# Patient Record
Sex: FEMALE | Race: WHITE | NOT HISPANIC OR LATINO
[De-identification: names, ages, dates, MRNs, and addresses within clinical notes are randomized per-mention and may not be internally consistent; named-entity substitution may affect disease eponyms.]

---

## 2018-02-21 PROBLEM — Z00.00 ENCOUNTER FOR PREVENTIVE HEALTH EXAMINATION: Status: ACTIVE | Noted: 2018-02-21

## 2018-02-22 ENCOUNTER — APPOINTMENT (OUTPATIENT)
Dept: VASCULAR SURGERY | Facility: CLINIC | Age: 43
End: 2018-02-22
Payer: MEDICAID

## 2018-02-22 DIAGNOSIS — M79.605 PAIN IN RIGHT LEG: ICD-10-CM

## 2018-02-22 DIAGNOSIS — M79.604 PAIN IN RIGHT LEG: ICD-10-CM

## 2018-02-22 PROCEDURE — 99243 OFF/OP CNSLTJ NEW/EST LOW 30: CPT

## 2018-02-23 VITALS — OXYGEN SATURATION: 95 % | SYSTOLIC BLOOD PRESSURE: 110 MMHG | DIASTOLIC BLOOD PRESSURE: 80 MMHG | HEART RATE: 84 BPM

## 2018-02-26 PROBLEM — M79.604 PAIN IN BOTH LOWER EXTREMITIES: Status: ACTIVE | Noted: 2018-02-23

## 2019-07-03 ENCOUNTER — APPOINTMENT (OUTPATIENT)
Dept: VASCULAR SURGERY | Facility: CLINIC | Age: 44
End: 2019-07-03
Payer: COMMERCIAL

## 2019-07-03 VITALS
DIASTOLIC BLOOD PRESSURE: 75 MMHG | HEART RATE: 88 BPM | TEMPERATURE: 98.3 F | BODY MASS INDEX: 55.32 KG/M2 | WEIGHT: 293 LBS | SYSTOLIC BLOOD PRESSURE: 110 MMHG | HEIGHT: 61 IN

## 2019-07-03 PROCEDURE — 99213 OFFICE O/P EST LOW 20 MIN: CPT

## 2019-07-03 PROCEDURE — 93970 EXTREMITY STUDY: CPT

## 2019-07-03 NOTE — ASSESSMENT
[FreeTextEntry1] : 42 yo female with history of pre dm, obesity presents for evaluation of b/l lower extremity edema with new ulcer over the posterior aspect of the left calf x 2 weeks\par pt states that she is unable to apply compression stockings her self \par venous duplex was negative for dvt difficult to assess for insufficency 2/2 body habitus \par will apply unna boot to left lower extremity to facilitate wound healing \par encouraged weight loss \par pt to follow up in 1 week

## 2019-07-03 NOTE — CONSULT LETTER
[Consult Letter:] : I had the pleasure of evaluating your patient, [unfilled]. [Dear  ___] : Dear  [unfilled], [FreeTextEntry3] : Linette Sharma PA-C\par [facility]\par  [Sincerely,] : Sincerely, [Please see my note below.] : Please see my note below.

## 2019-07-03 NOTE — HISTORY OF PRESENT ILLNESS
[FreeTextEntry1] : 42 yo female with history of pre dm, obesity presents for evaluation of b/l lower extremity edema with new ulcer over the posterior aspect of the left calf.  pt states that the ulcer developed about 2 weeks ago and has remained the same size.  pt states that she has a heavy feeling in both legs.  pt has been evaluated by bariatric surgery for gastric sleeve but was postponed for ovarian cyst removal.  \par pt denies any history of dvt in the past \par pt denies any purulent drainage, fevers or chills

## 2019-07-03 NOTE — PHYSICAL EXAM
[2+] : left 2+ [Ankle Swelling (On Exam)] : present [Ankle Swelling Bilaterally] : bilaterally  [] : bilaterally [Ankle Swelling On The Left] : moderate [No Rash or Lesion] : No rash or lesion [Alert] : alert [Skin Ulcer] : ulcer [Calm] : calm [de-identified] : appears well  [Varicose Veins Of Lower Extremities] : not present

## 2019-07-10 ENCOUNTER — APPOINTMENT (OUTPATIENT)
Dept: VASCULAR SURGERY | Facility: CLINIC | Age: 44
End: 2019-07-10
Payer: COMMERCIAL

## 2019-07-10 VITALS
BODY MASS INDEX: 55.32 KG/M2 | TEMPERATURE: 98.4 F | WEIGHT: 293 LBS | HEIGHT: 61 IN | DIASTOLIC BLOOD PRESSURE: 78 MMHG | HEART RATE: 86 BPM | SYSTOLIC BLOOD PRESSURE: 114 MMHG

## 2019-07-10 PROCEDURE — 99212 OFFICE O/P EST SF 10 MIN: CPT

## 2019-07-10 NOTE — HISTORY OF PRESENT ILLNESS
[FreeTextEntry1] : 42 yo female with history of pre dm, obesity, b/l lower extremity edema with ulcer over the posterior aspect of the left calf presents for follow up and unna boot change\par pt without any complaints at this time.  pt states that the leg feels better

## 2019-07-10 NOTE — ASSESSMENT
[FreeTextEntry1] : 44 yo female with history of pre dm, obesity, b/l lower extremity edema with ulcer over the posterior aspect of the left calf presents for follow up and unna boot change\par wound healed nicely with unna boot no further need for unna boot \par encouraged compression stockings and/or ace compressive wraps to include the feet \par encouraged weight loss\par pt to follow up as needed

## 2019-07-10 NOTE — PHYSICAL EXAM
[JVD] : no jugular venous distention  [Ankle Swelling (On Exam)] : present [Ankle Swelling Bilaterally] : bilaterally  [Ankle Swelling On The Left] : moderate [] : not present [Varicose Veins Of Lower Extremities] : not present [Alert] : alert [Calm] : calm [de-identified] : appears well  [de-identified] : left posterior calf wound with superficial layer of skin, healing well, no erythema, no drainage

## 2019-07-31 ENCOUNTER — APPOINTMENT (OUTPATIENT)
Dept: VASCULAR SURGERY | Facility: CLINIC | Age: 44
End: 2019-07-31

## 2019-10-14 ENCOUNTER — OUTPATIENT (OUTPATIENT)
Dept: OUTPATIENT SERVICES | Facility: HOSPITAL | Age: 44
LOS: 1 days | End: 2019-10-14
Payer: COMMERCIAL

## 2019-10-14 VITALS
HEART RATE: 82 BPM | RESPIRATION RATE: 16 BRPM | SYSTOLIC BLOOD PRESSURE: 130 MMHG | HEIGHT: 61 IN | DIASTOLIC BLOOD PRESSURE: 81 MMHG | WEIGHT: 293 LBS | OXYGEN SATURATION: 99 % | TEMPERATURE: 97 F

## 2019-10-14 DIAGNOSIS — Z90.721 ACQUIRED ABSENCE OF OVARIES, UNILATERAL: Chronic | ICD-10-CM

## 2019-10-14 DIAGNOSIS — Z98.890 OTHER SPECIFIED POSTPROCEDURAL STATES: Chronic | ICD-10-CM

## 2019-10-14 DIAGNOSIS — R94.39 ABNORMAL RESULT OF OTHER CARDIOVASCULAR FUNCTION STUDY: ICD-10-CM

## 2019-10-14 LAB
ALBUMIN SERPL ELPH-MCNC: 3.8 G/DL — SIGNIFICANT CHANGE UP (ref 3.3–5)
ALP SERPL-CCNC: 88 U/L — SIGNIFICANT CHANGE UP (ref 40–120)
ALT FLD-CCNC: 24 U/L — SIGNIFICANT CHANGE UP (ref 10–45)
ANION GAP SERPL CALC-SCNC: 11 MMOL/L — SIGNIFICANT CHANGE UP (ref 5–17)
AST SERPL-CCNC: 14 U/L — SIGNIFICANT CHANGE UP (ref 10–40)
BILIRUB SERPL-MCNC: 0.6 MG/DL — SIGNIFICANT CHANGE UP (ref 0.2–1.2)
BUN SERPL-MCNC: 18 MG/DL — SIGNIFICANT CHANGE UP (ref 7–23)
CALCIUM SERPL-MCNC: 8.8 MG/DL — SIGNIFICANT CHANGE UP (ref 8.4–10.5)
CHLORIDE SERPL-SCNC: 102 MMOL/L — SIGNIFICANT CHANGE UP (ref 96–108)
CO2 SERPL-SCNC: 27 MMOL/L — SIGNIFICANT CHANGE UP (ref 22–31)
CREAT SERPL-MCNC: 0.61 MG/DL — SIGNIFICANT CHANGE UP (ref 0.5–1.3)
GLUCOSE SERPL-MCNC: 132 MG/DL — HIGH (ref 70–99)
HCG SERPL-ACNC: <2 MIU/ML — SIGNIFICANT CHANGE UP
HCT VFR BLD CALC: 37.6 % — SIGNIFICANT CHANGE UP (ref 34.5–45)
HGB BLD-MCNC: 12 G/DL — SIGNIFICANT CHANGE UP (ref 11.5–15.5)
MCHC RBC-ENTMCNC: 28 PG — SIGNIFICANT CHANGE UP (ref 27–34)
MCHC RBC-ENTMCNC: 31.9 GM/DL — LOW (ref 32–36)
MCV RBC AUTO: 87.9 FL — SIGNIFICANT CHANGE UP (ref 80–100)
NRBC # BLD: 0 /100 WBCS — SIGNIFICANT CHANGE UP (ref 0–0)
PLATELET # BLD AUTO: 298 K/UL — SIGNIFICANT CHANGE UP (ref 150–400)
POTASSIUM SERPL-MCNC: 4.2 MMOL/L — SIGNIFICANT CHANGE UP (ref 3.5–5.3)
POTASSIUM SERPL-SCNC: 4.2 MMOL/L — SIGNIFICANT CHANGE UP (ref 3.5–5.3)
PROT SERPL-MCNC: 7 G/DL — SIGNIFICANT CHANGE UP (ref 6–8.3)
RBC # BLD: 4.28 M/UL — SIGNIFICANT CHANGE UP (ref 3.8–5.2)
RBC # FLD: 13.4 % — SIGNIFICANT CHANGE UP (ref 10.3–14.5)
SODIUM SERPL-SCNC: 140 MMOL/L — SIGNIFICANT CHANGE UP (ref 135–145)
WBC # BLD: 6.47 K/UL — SIGNIFICANT CHANGE UP (ref 3.8–10.5)
WBC # FLD AUTO: 6.47 K/UL — SIGNIFICANT CHANGE UP (ref 3.8–10.5)

## 2019-10-14 PROCEDURE — C1894: CPT

## 2019-10-14 PROCEDURE — 93010 ELECTROCARDIOGRAM REPORT: CPT

## 2019-10-14 PROCEDURE — C1769: CPT

## 2019-10-14 PROCEDURE — 80053 COMPREHEN METABOLIC PANEL: CPT

## 2019-10-14 PROCEDURE — 93005 ELECTROCARDIOGRAM TRACING: CPT

## 2019-10-14 PROCEDURE — 93454 CORONARY ARTERY ANGIO S&I: CPT | Mod: 26,GC

## 2019-10-14 PROCEDURE — C1887: CPT

## 2019-10-14 PROCEDURE — 93454 CORONARY ARTERY ANGIO S&I: CPT

## 2019-10-14 PROCEDURE — 84702 CHORIONIC GONADOTROPIN TEST: CPT

## 2019-10-14 PROCEDURE — 85027 COMPLETE CBC AUTOMATED: CPT

## 2019-10-14 RX ORDER — SPIRONOLACTONE 25 MG/1
1 TABLET, FILM COATED ORAL
Qty: 0 | Refills: 0 | DISCHARGE

## 2019-10-14 RX ORDER — CHOLECALCIFEROL (VITAMIN D3) 125 MCG
1 CAPSULE ORAL
Qty: 0 | Refills: 0 | DISCHARGE

## 2019-10-14 RX ORDER — FUROSEMIDE 40 MG
1 TABLET ORAL
Qty: 0 | Refills: 0 | DISCHARGE

## 2019-10-14 RX ORDER — LEVOTHYROXINE SODIUM 125 MCG
1 TABLET ORAL
Qty: 0 | Refills: 0 | DISCHARGE

## 2019-10-14 NOTE — H&P CARDIOLOGY - HISTORY OF PRESENT ILLNESS
This is a 42 yo morbid obese,  female with PMH of hypothyroidism, bl lower extr chronic edema ( on Lasix and Spironolactone), and sleep apnea ( non compliant with CPAP), who presents to Dr Delgado with c/c of shortness of breath on exertion ( with minimal levels of exertion).  Patient denies associated symptoms of: chest pain, dizziness, palpitations, n&V, HA. NST on 10/3/2019 with findings concerning for inferior wall myocardial ischemia.  Presents here today for [facility] for further evaluation.  Presently asymptomatic.     LMP 03/2019 ( pt says menstrual period stopped in March after she had right ovary removed)

## 2019-10-29 ENCOUNTER — APPOINTMENT (OUTPATIENT)
Dept: GASTROENTEROLOGY | Facility: CLINIC | Age: 44
End: 2019-10-29

## 2019-10-29 PROBLEM — R60.9 EDEMA, UNSPECIFIED: Chronic | Status: ACTIVE | Noted: 2019-10-14

## 2019-10-29 PROBLEM — G47.30 SLEEP APNEA, UNSPECIFIED: Chronic | Status: ACTIVE | Noted: 2019-10-14

## 2019-10-29 PROBLEM — N83.209 UNSPECIFIED OVARIAN CYST, UNSPECIFIED SIDE: Chronic | Status: ACTIVE | Noted: 2019-10-14

## 2019-10-29 PROBLEM — E66.01 MORBID (SEVERE) OBESITY DUE TO EXCESS CALORIES: Chronic | Status: ACTIVE | Noted: 2019-10-14

## 2019-10-29 PROBLEM — E03.9 HYPOTHYROIDISM, UNSPECIFIED: Chronic | Status: ACTIVE | Noted: 2019-10-14

## 2019-11-19 ENCOUNTER — APPOINTMENT (OUTPATIENT)
Dept: GASTROENTEROLOGY | Facility: CLINIC | Age: 44
End: 2019-11-19

## 2019-12-17 ENCOUNTER — APPOINTMENT (OUTPATIENT)
Dept: GASTROENTEROLOGY | Facility: CLINIC | Age: 44
End: 2019-12-17

## 2020-01-03 ENCOUNTER — APPOINTMENT (OUTPATIENT)
Dept: GASTROENTEROLOGY | Facility: CLINIC | Age: 45
End: 2020-01-03
Payer: COMMERCIAL

## 2020-01-03 VITALS
TEMPERATURE: 97.6 F | HEIGHT: 61 IN | BODY MASS INDEX: 55.32 KG/M2 | DIASTOLIC BLOOD PRESSURE: 60 MMHG | OXYGEN SATURATION: 97 % | RESPIRATION RATE: 16 BRPM | SYSTOLIC BLOOD PRESSURE: 118 MMHG | WEIGHT: 293 LBS | HEART RATE: 88 BPM

## 2020-01-03 DIAGNOSIS — M79.89 OTHER SPECIFIED SOFT TISSUE DISORDERS: ICD-10-CM

## 2020-01-03 DIAGNOSIS — Z80.3 FAMILY HISTORY OF MALIGNANT NEOPLASM OF BREAST: ICD-10-CM

## 2020-01-03 DIAGNOSIS — R19.8 OTHER SPECIFIED SYMPTOMS AND SIGNS INVOLVING THE DIGESTIVE SYSTEM AND ABDOMEN: ICD-10-CM

## 2020-01-03 DIAGNOSIS — Z78.9 OTHER SPECIFIED HEALTH STATUS: ICD-10-CM

## 2020-01-03 PROCEDURE — 99204 OFFICE O/P NEW MOD 45 MIN: CPT

## 2020-01-03 RX ORDER — SPIRONOLACTONE 25 MG/1
25 TABLET ORAL
Refills: 0 | Status: ACTIVE | COMMUNITY

## 2020-01-03 RX ORDER — FUROSEMIDE 40 MG/1
40 TABLET ORAL
Refills: 0 | Status: ACTIVE | COMMUNITY

## 2020-01-03 NOTE — REVIEW OF SYSTEMS
[As Noted in HPI] : as noted in HPI [Limb Swelling] : limb swelling [Negative] : Endocrine [FreeTextEntry2] : Obesity

## 2020-01-03 NOTE — ASSESSMENT
[FreeTextEntry1] : Patient with morbid obesity. Sonogram revealed a large fatty liver. Her LFTs were normal including transaminases, alkaline phosphatase, and bilirubin. Total protein and albumin were normal. Hemoglobin A1c was 6, ferritin 53, H./H 12.5/39.2, MCV 87.\par She also has an asymptomatic gallstone. There was no gallbladder wall thickening.\par \par The patient will have a liver fibrosis panel to see if there is any scarring in the liver. She will likely have to reschedule her bariatric surgery.

## 2020-01-03 NOTE — HISTORY OF PRESENT ILLNESS
[FreeTextEntry1] : Patient is a 44-year-old female with morbid obesity. She was scheduled for sleeve gastrectomy last year but had a 10 cm ovarian cyst which was removed and was benign.\par She recently has noted some constipation. She moves her bowels every other day. She denies seeing any blood in the stool.\par She denies any upper gastrointestinal symptoms such as heartburn or regurgitation.\par Patient had a recent sonogram that revealed a large fatty liver. LFT's-normal.\par She also had a mobile gallstone. Denies any abdominal pain.

## 2020-01-03 NOTE — PHYSICAL EXAM
[General Appearance - Alert] : alert [General Appearance - In No Acute Distress] : in no acute distress [PERRL With Normal Accommodation] : pupils were equal in size, round, and reactive to light [Sclera] : the sclera and conjunctiva were normal [Extraocular Movements] : extraocular movements were intact [Outer Ear] : the ears and nose were normal in appearance [Oropharynx] : the oropharynx was normal [Neck Appearance] : the appearance of the neck was normal [Neck Cervical Mass (___cm)] : no neck mass was observed [Thyroid Diffuse Enlargement] : the thyroid was not enlarged [Jugular Venous Distention Increased] : there was no jugular-venous distention [Thyroid Nodule] : there were no palpable thyroid nodules [Auscultation Breath Sounds / Voice Sounds] : lungs were clear to auscultation bilaterally [Heart Sounds] : normal S1 and S2 [Heart Rate And Rhythm] : heart rate was normal and rhythm regular [Heart Sounds Gallop] : no gallops [Murmurs] : no murmurs [Heart Sounds Pericardial Friction Rub] : no pericardial rub [Bowel Sounds] : normal bowel sounds [Abdomen Soft] : soft [Abdomen Tenderness] : non-tender [] : no hepato-splenomegaly [Abdomen Mass (___ Cm)] : no abdominal mass palpated [FreeTextEntry1] : Protuberant. Difficult to examine [No CVA Tenderness] : no ~M costovertebral angle tenderness [No Spinal Tenderness] : no spinal tenderness [Nail Clubbing] : no clubbing  or cyanosis of the fingernails [Abnormal Walk] : normal gait [Musculoskeletal - Swelling] : no joint swelling seen [Motor Tone] : muscle strength and tone were normal [Oriented To Time, Place, And Person] : oriented to person, place, and time [Affect] : the affect was normal [Impaired Insight] : insight and judgment were intact

## 2020-04-03 ENCOUNTER — APPOINTMENT (OUTPATIENT)
Dept: GASTROENTEROLOGY | Facility: CLINIC | Age: 45
End: 2020-04-03

## 2020-05-20 ENCOUNTER — APPOINTMENT (OUTPATIENT)
Dept: GASTROENTEROLOGY | Facility: CLINIC | Age: 45
End: 2020-05-20
Payer: COMMERCIAL

## 2020-05-20 VITALS
BODY MASS INDEX: 55.32 KG/M2 | OXYGEN SATURATION: 98 % | WEIGHT: 293 LBS | HEART RATE: 103 BPM | DIASTOLIC BLOOD PRESSURE: 70 MMHG | SYSTOLIC BLOOD PRESSURE: 130 MMHG | HEIGHT: 61 IN

## 2020-05-20 DIAGNOSIS — Z12.11 ENCOUNTER FOR SCREENING FOR MALIGNANT NEOPLASM OF COLON: ICD-10-CM

## 2020-05-20 PROCEDURE — 99214 OFFICE O/P EST MOD 30 MIN: CPT

## 2020-05-20 NOTE — ASSESSMENT
[FreeTextEntry1] : Patient with marketed morbid obesity and a BMI of 70.  She is in the process of undergoing bariatric surgery evaluation.  She has minimal GI symptoms.  Screening colonoscopy and pre-bariatric surgery upper endoscopy will be scheduled.\par Patient has a fatty liver with hepatomegaly and an asymptomatic gallstone.  LFTs have been normal. Liver fibrosis panel will be ordered.\par To do a tele-video call with patient prior to her colonoscopy and upper endoscopy which will be scheduled in the fall.  She will see her bariatric surgeon in the meantime..

## 2020-05-20 NOTE — PHYSICAL EXAM
[General Appearance - Alert] : alert [General Appearance - In No Acute Distress] : in no acute distress [FreeTextEntry1] : Obese [Sclera] : the sclera and conjunctiva were normal [PERRL With Normal Accommodation] : pupils were equal in size, round, and reactive to light [Extraocular Movements] : extraocular movements were intact [Oropharynx] : the oropharynx was normal [Outer Ear] : the ears and nose were normal in appearance [Neck Appearance] : the appearance of the neck was normal [Neck Cervical Mass (___cm)] : no neck mass was observed [Jugular Venous Distention Increased] : there was no jugular-venous distention [Thyroid Nodule] : there were no palpable thyroid nodules [Thyroid Diffuse Enlargement] : the thyroid was not enlarged [Heart Rate And Rhythm] : heart rate was normal and rhythm regular [Auscultation Breath Sounds / Voice Sounds] : lungs were clear to auscultation bilaterally [Heart Sounds] : normal S1 and S2 [Heart Sounds Gallop] : no gallops [Murmurs] : no murmurs [Bowel Sounds] : normal bowel sounds [Heart Sounds Pericardial Friction Rub] : no pericardial rub [Abdomen Soft] : soft [Abdomen Tenderness] : non-tender [] : no hepato-splenomegaly [No CVA Tenderness] : no ~M costovertebral angle tenderness [Abdomen Mass (___ Cm)] : no abdominal mass palpated [Abnormal Walk] : normal gait [No Spinal Tenderness] : no spinal tenderness [Musculoskeletal - Swelling] : no joint swelling seen [Motor Tone] : muscle strength and tone were normal [Nail Clubbing] : no clubbing  or cyanosis of the fingernails [Oriented To Time, Place, And Person] : oriented to person, place, and time [Impaired Insight] : insight and judgment were intact [Affect] : the affect was normal

## 2020-05-20 NOTE — HISTORY OF PRESENT ILLNESS
[FreeTextEntry1] : Patient is a 44-year-old female with morbid obesity. She was scheduled for sleeve gastrectomy last year but had a 10 cm ovarian cyst which was removed and was benign.\par BM's have been regular. She denies seeing any blood in the stool. She denies abdominal pain.\par She denies any upper gastrointestinal symptoms such as heartburn or regurgitation.\par Patient had a recent sonogram that revealed a large fatty liver. LFT's-normal.\par She also had a mobile gallstone. Denies any abdominal pain.

## 2020-08-04 ENCOUNTER — APPOINTMENT (OUTPATIENT)
Dept: BARIATRICS | Facility: CLINIC | Age: 45
End: 2020-08-04
Payer: COMMERCIAL

## 2020-08-04 VITALS — HEIGHT: 61 IN | WEIGHT: 293 LBS | BODY MASS INDEX: 55.32 KG/M2

## 2020-08-04 DIAGNOSIS — Z13.29 ENCOUNTER FOR SCREENING FOR OTHER SUSPECTED ENDOCRINE DISORDER: ICD-10-CM

## 2020-08-04 DIAGNOSIS — Z01.818 ENCOUNTER FOR OTHER PREPROCEDURAL EXAMINATION: ICD-10-CM

## 2020-08-04 DIAGNOSIS — Z13.0 ENCOUNTER FOR SCREENING FOR DISEASES OF THE BLOOD AND BLOOD-FORMING ORGANS AND CERTAIN DISORDERS INVOLVING THE IMMUNE MECHANISM: ICD-10-CM

## 2020-08-04 DIAGNOSIS — Z13.0 ENCOUNTER FOR SCREENING FOR OTHER SUSPECTED ENDOCRINE DISORDER: ICD-10-CM

## 2020-08-04 DIAGNOSIS — K80.20 CALCULUS OF GALLBLADDER W/OUT CHOLECYSTITIS W/OUT OBSTRUCTION: ICD-10-CM

## 2020-08-04 DIAGNOSIS — Z13.228 ENCOUNTER FOR SCREENING FOR OTHER SUSPECTED ENDOCRINE DISORDER: ICD-10-CM

## 2020-08-04 DIAGNOSIS — R16.0 HEPATOMEGALY, NOT ELSEWHERE CLASSIFIED: ICD-10-CM

## 2020-08-04 PROCEDURE — 99205 OFFICE O/P NEW HI 60 MIN: CPT | Mod: 95

## 2020-08-04 NOTE — PHYSICAL EXAM
[Obese, well nourished, in no acute distress] : obese, well nourished, in no acute distress [Normal] : affect appropriate [de-identified] : .

## 2020-08-04 NOTE — ASSESSMENT
[FreeTextEntry1] : 44 year old woman with long-standing history of morbid obesity presents today to discuss options for weight loss surgery.  I had an extensive discussion with the patient reviewing the Laparoscopic Sleeve Gastrectomy. Diagrams were used. All questions were answered.  \par \par Complications were discussed including but not limited to: vitamin and protein deficiencies, pneumonia, urinary infection, wound infection, leaks/peritonitis possibly requiring intraabdominal drains or reoperation, bleeding, DVT, pulmonary embolus, severe reflux, sleeve obstruction, abdominal wall hernias, revisions, death, inadequate weight loss. The importance of vitamins and protein supplementation was stressed, as was the importance of follow-up and exercise. \par \par Patient encouraged to make dietary and lifestyle changes in preparation for surgery.\par \par Patient with a long history of morbid obesity.She is interested in the Laparoscopic Sleeve Gastrectomy. She was given written material to review.  Pre-operative evaluations were reviewed. She will need to lose weight prior to surgery and will be seen in the office prior to surgery.She was told to call with any questions. \par \par Patient needs to use CPAP and will need to lose weight prior to surgery.

## 2020-08-04 NOTE — CONSULT LETTER
[Please see my note below.] : Please see my note below. [Consult Closing:] : Thank you very much for allowing me to participate in the care of this patient.  If you have any questions, please do not hesitate to contact me. [Consult Letter:] : I had the pleasure of evaluating your patient, [unfilled]. [Sincerely,] : Sincerely, [FreeTextEntry3] : Leona Guerrero MD, FACS

## 2020-08-04 NOTE — HISTORY OF PRESENT ILLNESS
[de-identified] : 44 year old woman with a long-standing history of morbid obesity, who has attempted numerous weight loss treatments without long term success. Patient had started the workup for surgery with another practice but was found to have an ovarian cyst which required surgery and she never ended up having weight loss surgery,  She presents today to restart the process. Patient is familiar with the Laparoscopic Adjustable Gastric Band, the Laparoscopic Sleeve Gastrectomy and the Laparoscopic Gastric Bypass. Patient presents today to discuss options for surgery, specifically the Laparoscopic Sleeve Gastrectomy.

## 2020-08-06 VITALS — WEIGHT: 293 LBS | BODY MASS INDEX: 55.32 KG/M2 | HEIGHT: 61 IN

## 2020-08-12 ENCOUNTER — APPOINTMENT (OUTPATIENT)
Dept: BARIATRICS/WEIGHT MGMT | Facility: CLINIC | Age: 45
End: 2020-08-12

## 2020-08-12 ENCOUNTER — APPOINTMENT (OUTPATIENT)
Dept: BARIATRICS/WEIGHT MGMT | Facility: CLINIC | Age: 45
End: 2020-08-12
Payer: COMMERCIAL

## 2020-08-12 PROCEDURE — 97802 MEDICAL NUTRITION INDIV IN: CPT | Mod: 95

## 2020-08-20 ENCOUNTER — APPOINTMENT (OUTPATIENT)
Dept: BARIATRICS/WEIGHT MGMT | Facility: CLINIC | Age: 45
End: 2020-08-20
Payer: COMMERCIAL

## 2020-08-20 VITALS — HEIGHT: 61 IN | BODY MASS INDEX: 55.32 KG/M2 | WEIGHT: 293 LBS

## 2020-08-20 PROCEDURE — 90791 PSYCH DIAGNOSTIC EVALUATION: CPT | Mod: 95

## 2020-08-26 ENCOUNTER — APPOINTMENT (OUTPATIENT)
Dept: BARIATRICS | Facility: CLINIC | Age: 45
End: 2020-08-26
Payer: COMMERCIAL

## 2020-08-26 ENCOUNTER — TRANSCRIPTION ENCOUNTER (OUTPATIENT)
Age: 45
End: 2020-08-26

## 2020-08-26 VITALS
HEIGHT: 61 IN | SYSTOLIC BLOOD PRESSURE: 128 MMHG | BODY MASS INDEX: 55.32 KG/M2 | HEART RATE: 77 BPM | OXYGEN SATURATION: 98 % | DIASTOLIC BLOOD PRESSURE: 82 MMHG | WEIGHT: 293 LBS

## 2020-08-26 DIAGNOSIS — Z87.898 PERSONAL HISTORY OF OTHER SPECIFIED CONDITIONS: ICD-10-CM

## 2020-08-26 PROCEDURE — 99213 OFFICE O/P EST LOW 20 MIN: CPT

## 2020-08-26 RX ORDER — LEVOTHYROXINE SODIUM 0.1 MG/1
100 TABLET ORAL
Refills: 0 | Status: DISCONTINUED | COMMUNITY
End: 2020-08-26

## 2020-08-26 RX ORDER — MELATONIN 3 MG
25 MCG TABLET ORAL
Refills: 0 | Status: ACTIVE | COMMUNITY

## 2020-08-26 RX ORDER — LEVOTHYROXINE SODIUM 0.12 MG/1
125 TABLET ORAL
Refills: 0 | Status: ACTIVE | COMMUNITY

## 2020-08-27 DIAGNOSIS — Z01.812 ENCOUNTER FOR PREPROCEDURAL LABORATORY EXAMINATION: ICD-10-CM

## 2020-08-31 NOTE — ASSESSMENT
[FreeTextEntry1] : 44 year old female with longstanding history of obesity undergoing preoperative workup for laparoscopic sleeve gastrectomy here today for follow up visit. Patient lost weight since last visit and is encouraged to continue to lose weight prior to surgery. Nutrition and exercise guidelines were reviewed with the patient. She will continue to see nutritionist and psychologist monthly.  Procedure and risks reviewed. All questions answered.\par \par Follow up with nutritionist and psychologist monthly\par Follow up in 2-3 months\par Call with any questions or concerns.\par

## 2020-08-31 NOTE — PHYSICAL EXAM
[Obese, well nourished, in no acute distress] : obese, well nourished, in no acute distress [Normal] : affect appropriate [de-identified] : EOMI, anicteric  [de-identified] : obese, soft, nontender, no evidence of hernia. well healed infraumbilical incision

## 2020-08-31 NOTE — REVIEW OF SYSTEMS
[Recent Change In Weight] : ~T recent weight change [Lower Ext Edema] : lower extremity edema [Negative] : Psychiatric [Fever] : no fever [Night Sweats] : no night sweats [Chills] : no chills [Eye Pain] : no eye pain [Fatigue] : no fatigue [Red Eyes] : eyes not red [Vision Problems] : no vision problems [Hoarseness] : no hoarseness [Dysphagia] : no dysphagia [Chest Pain] : no chest pain [Odynophagia] : no odynophagia [Palpitations] : no palpitations [Leg Claudication] : no intermittent leg claudication [Wheezing] : no wheezing [Shortness Of Breath] : no shortness of breath [Cough] : no cough [Abdominal Pain] : no abdominal pain [Reflux/Heartburn] : no reflux/ heartburn [Vomiting] : no vomiting [FreeTextEntry2] : weight loss

## 2020-08-31 NOTE — HISTORY OF PRESENT ILLNESS
[de-identified] : 44 year old female with longstanding history of obesity undergoing workup for laparoscopic sleeve gastrectomy presents today for follow up visit. Patient lost weight since last visit. She had appointments with nutritionist and psychologist and is making dietary changes. For exercise, she is walking more frequently and plans to resume working out with .

## 2020-09-11 ENCOUNTER — APPOINTMENT (OUTPATIENT)
Dept: BARIATRICS/WEIGHT MGMT | Facility: CLINIC | Age: 45
End: 2020-09-11
Payer: COMMERCIAL

## 2020-09-11 PROCEDURE — 97803 MED NUTRITION INDIV SUBSEQ: CPT | Mod: 95

## 2020-09-14 LAB — SARS-COV-2 N GENE NPH QL NAA+PROBE: NOT DETECTED

## 2020-09-15 ENCOUNTER — LABORATORY RESULT (OUTPATIENT)
Age: 45
End: 2020-09-15

## 2020-09-16 ENCOUNTER — NON-APPOINTMENT (OUTPATIENT)
Age: 45
End: 2020-09-16

## 2020-09-16 ENCOUNTER — APPOINTMENT (OUTPATIENT)
Dept: CARDIOLOGY | Facility: CLINIC | Age: 45
End: 2020-09-16
Payer: COMMERCIAL

## 2020-09-16 VITALS
BODY MASS INDEX: 55.32 KG/M2 | HEART RATE: 69 BPM | DIASTOLIC BLOOD PRESSURE: 78 MMHG | SYSTOLIC BLOOD PRESSURE: 128 MMHG | RESPIRATION RATE: 15 BRPM | HEIGHT: 61 IN | WEIGHT: 293 LBS

## 2020-09-16 PROCEDURE — 99203 OFFICE O/P NEW LOW 30 MIN: CPT

## 2020-09-16 PROCEDURE — 93000 ELECTROCARDIOGRAM COMPLETE: CPT | Mod: NC

## 2020-09-16 NOTE — DISCUSSION/SUMMARY
[FreeTextEntry1] : This is a 44-year-old female past medical history significant for hyperglycemia, obesity, sleep apnea, ovarian cyst, status post hernia repair, status post right oophorectomy, status post meniscus repair, hypothyroidism, who comes in for cardiac preoperative consultation.\par The patient is being considered for bariatric surgery in the next few months.\par She denies chest pain, shortness of breath, dizziness or syncope.  She does get occasional dyspnea on exertion.\par The patient is currently on Lasix 40 mg daily, Synthroid 125 mcg a day, Aldactone 25 mg daily.\par Electrocardiogram done September 16, 2020 demonstrated normal sinus rhythm rate 68 bpm is otherwise unremarkable.\par The patient had blood work done November 18, 2019 which demonstrated cholesterol 166, triglycerides 69, HDL direct 47 mg/dL, hemoglobin A1c of 6.0, and LDL cholesterol 105 mg/dL.\par The patient will schedule an echo Doppler examination to evaluate her left ventricular function, murmur, chamber size, and rule out hypertrophy.\par The patient will also schedule a pharmacologic stress echocardiogram to rule out significant coronary artery disease.  The patient will follow-up with me after above-noted diagnostic tests are completed.  She is instructed to follow-up with her primary care physician.\par The patient understands that aerobic exercises must be increased to 40 minutes 4 times per week. A detailed discussion of lifestyle modification was done today. The patient has a good understanding of the diagnosis, and treatment plan. Lifestyle modification was also outlined.

## 2020-09-16 NOTE — REASON FOR VISIT
[Consultation] : a consultation regarding [Dyspnea] : dyspnea [FreeTextEntry2] : Preoperative bariatric surgery [FreeTextEntry1] : murmur, pre-op clearance

## 2020-09-16 NOTE — PHYSICAL EXAM
[General Appearance - Well Developed] : well developed [General Appearance - Well Nourished] : well nourished [Normal Appearance] : normal appearance [Well Groomed] : well groomed [General Appearance - In No Acute Distress] : no acute distress [No Deformities] : no deformities [Normal Conjunctiva] : the conjunctiva exhibited no abnormalities [Normal Oral Mucosa] : normal oral mucosa [Normal Oropharynx] : normal oropharynx [Normal Jugular Venous V Waves Present] : normal jugular venous V waves present [Normal Jugular Venous A Waves Present] : normal jugular venous A waves present [5th Left ICS - MCL] : palpated at the 5th LICS in the midclavicular line [No Jugular Venous Medina A Waves] : no jugular venous medina A waves [Normal] : normal [Normal Rate] : normal [No Precordial Heave] : no precordial heave was noted [Rhythm Regular] : regular [Normal S1] : normal S1 [Normal S2] : normal S2 [II] : a grade 2 [2+] : right 2+ [No Abnormalities] : the abdominal aorta was not enlarged and no bruit was heard [Auscultation Breath Sounds / Voice Sounds] : lungs were clear to auscultation bilaterally [Exaggerated Use Of Accessory Muscles For Inspiration] : no accessory muscle use [Respiration, Rhythm And Depth] : normal respiratory rhythm and effort [Bowel Sounds] : normal bowel sounds [Abnormal Walk] : normal gait [Abdomen Soft] : soft [Cyanosis, Localized] : no localized cyanosis [Nail Clubbing] : no clubbing of the fingernails [Gait - Sufficient For Exercise Testing] : the gait was sufficient for exercise testing [Skin Color & Pigmentation] : normal skin color and pigmentation [] : no rash [Skin Turgor] : normal skin turgor [Mood] : the mood was normal [No Anxiety] : not feeling anxious [Affect] : the affect was normal [Oriented To Time, Place, And Person] : oriented to person, place, and time [Nonpitting Edema] : nonpitting edema present [S3] : no S3 [S4] : no S4 [Click] : no click [Distant] : the heart sounds were ~L not distant [Pericardial Rub] : no pericardial rub [Right Carotid Bruit] : no bruit heard over the right carotid [Left Carotid Bruit] : no bruit heard over the left carotid [Right Femoral Bruit] : no bruit heard over the right femoral artery [Left Femoral Bruit] : no bruit heard over the left femoral artery [Rt] : no varicose veins of the right leg [Lt] : no varicose veins of the left leg

## 2020-09-18 ENCOUNTER — APPOINTMENT (OUTPATIENT)
Dept: PULMONOLOGY | Facility: CLINIC | Age: 45
End: 2020-09-18
Payer: COMMERCIAL

## 2020-09-18 VITALS
WEIGHT: 293 LBS | TEMPERATURE: 97.9 F | HEIGHT: 61 IN | HEART RATE: 72 BPM | OXYGEN SATURATION: 98 % | BODY MASS INDEX: 55.32 KG/M2

## 2020-09-18 PROCEDURE — 94727 GAS DIL/WSHOT DETER LNG VOL: CPT

## 2020-09-18 PROCEDURE — 94010 BREATHING CAPACITY TEST: CPT

## 2020-09-18 PROCEDURE — 99203 OFFICE O/P NEW LOW 30 MIN: CPT | Mod: 25

## 2020-09-18 PROCEDURE — 94729 DIFFUSING CAPACITY: CPT

## 2020-09-18 NOTE — ASSESSMENT
[FreeTextEntry1] : 44 year old female Hx morbid obesity, hypothyroidism, VAHE not using CPAP presents for pre surgical pulmonary evaluation prior to planned gastric sleeve surgery\par \par Recommend Home Sleep Study\par \par Follow up/clearance  pending sleep study

## 2020-09-18 NOTE — PROCEDURE
[FreeTextEntry1] : PFT 9/18/20 personally reviewed minimal obstructive ventilatory with mild gas exchange abnormality

## 2020-09-18 NOTE — HISTORY OF PRESENT ILLNESS
[Never] : never [Obstructive Sleep Apnea] : obstructive sleep apnea [Fatigue] : fatigue [Recent  Weight Gain] : recent  weight gain [Snoring] : snoring [TextBox_4] : Patient is a 44 year old female Hx VAHE, hypothyroidism presents to [facility] for pre surgical optimization prior to planned gastric sleeve surgery.  Patient reports she carries diagnosis VAHE however does not use CPAP.  She experienced problems with the mask.  She denies respiratory complaints but admits to snoring, sleepiness.

## 2020-09-21 ENCOUNTER — APPOINTMENT (OUTPATIENT)
Dept: ENDOCRINOLOGY | Facility: CLINIC | Age: 45
End: 2020-09-21
Payer: COMMERCIAL

## 2020-09-21 VITALS
TEMPERATURE: 98 F | WEIGHT: 293 LBS | BODY MASS INDEX: 55.32 KG/M2 | HEART RATE: 76 BPM | OXYGEN SATURATION: 98 % | HEIGHT: 61 IN

## 2020-09-21 PROCEDURE — 99205 OFFICE O/P NEW HI 60 MIN: CPT

## 2020-09-21 NOTE — CONSULT LETTER
[Dear  ___] : Dear  [unfilled], [Consult Letter:] : I had the pleasure of evaluating your patient, [unfilled]. [Please see my note below.] : Please see my note below. [Consult Closing:] : Thank you very much for allowing me to participate in the care of this patient.  If you have any questions, please do not hesitate to contact me. [Sincerely,] : Sincerely, [FreeTextEntry2] : Linette Delgado MD [FreeTextEntry3] : Pancho De La Rosa MD\par

## 2020-09-21 NOTE — ASSESSMENT
[Levothyroxine] : The patient was instructed to take Levothyroxine on an empty stomach, separate from vitamins, and wait at least 30 minutes before eating

## 2020-09-21 NOTE — REASON FOR VISIT
[Consultation] : a consultation visit [Hypothyroidism] : hypothyroidism [Weight Management/Obesity] : weight management/obesity [FreeTextEntry2] : Linette Alexis.

## 2020-09-21 NOTE — HISTORY OF PRESENT ILLNESS
[FreeTextEntry1] : Ms. SHWETA GAGNON is 44 year old female with history of hypothyroidism, obesity here to establish care for hypothyroidism. \par Diagnosed 4-5 years ago.  Patient states that her hypothyroidism is usually managed by her primary care doctor Dr. Delgado.  She was previously taking levothyroxine 100 mcg once daily.  She takes the medication correctly.  She stated that in August 2020, the dosage was increased from 100 mcg to 125 mcg once daily.  She had blood work done with her cardiologist in September 16, 2020. Found to have normal TSH level of 2.59, free T4 level of 1.7.\par Ms. GAGNON  reports:\par There is no fatigue\par There is trouble losing weight\par There is no cold intolerance\par There is constipation\par There is difficulty with sleep, she has sleep apena.  She does not use a machine at night.  \par There is no depression \par There is irregular menses\par There is a family history of thyroid disease, her aunt had thyroid surgery and is now taking thyroid medication.  \par There is not  a personal history of radiation exposure\par There is no exposure to amiodarone, recent iodine containing contrast \par There is not exogenous thyroid intake, nutritional supplements. \par  \par Patient is currently under evaluation of bariatric surgery for gastric sleeve surgery.  She was encouraged to lose 60 pounds prior to surgical intervention.  She is currently not on any weight loss medications.  She is following up with the nutritionist as well as a psychiatrist at the weight loss management center.  Patient stated she had never been to medical weight management office before.\par \par Patient with history of bilateral lower extremity swellings.  She is currently taking spironolactone 25 mg once daily, Lasix 40 mg as needed for lower extremity swelling.\par \par Patient also with a history of vitamin D deficiency, was recently started on vitamin D supplementation.\par \par Patient stated that her weight gain is likely due to stress, after the passing of her mother, she had emotional eating, thus leading to obesity.  Her current BMI is 68.78.

## 2020-09-21 NOTE — PHYSICAL EXAM
[Alert] : alert [Well Nourished] : well nourished [Obese] : obese [No Acute Distress] : no acute distress [Well Developed] : well developed [Normal Sclera/Conjunctiva] : normal sclera/conjunctiva [EOMI] : extra ocular movement intact [No Proptosis] : no proptosis [Normal Oropharynx] : the oropharynx was normal [Thyroid Not Enlarged] : the thyroid was not enlarged [No Thyroid Nodules] : no palpable thyroid nodules [No Respiratory Distress] : no respiratory distress [No Accessory Muscle Use] : no accessory muscle use [Clear to Auscultation] : lungs were clear to auscultation bilaterally [Normal S1, S2] : normal S1 and S2 [Normal Rate] : heart rate was normal [Regular Rhythm] : with a regular rhythm [No Edema] : no peripheral edema [Pedal Pulses Normal] : the pedal pulses are present [Normal Bowel Sounds] : normal bowel sounds [Not Tender] : non-tender [Not Distended] : not distended [Soft] : abdomen soft [Normal Anterior Cervical Nodes] : no anterior cervical lymphadenopathy [Normal Posterior Cervical Nodes] : no posterior cervical lymphadenopathy [No Spinal Tenderness] : no spinal tenderness [Spine Straight] : spine straight [No Stigmata of Cushings Syndrome] : no stigmata of Cushings Syndrome [Normal Gait] : normal gait [Normal Strength/Tone] : muscle strength and tone were normal [No Rash] : no rash [Normal Reflexes] : deep tendon reflexes were 2+ and symmetric [No Tremors] : no tremors [Oriented x3] : oriented to person, place, and time [Acanthosis Nigricans] : no acanthosis nigricans

## 2020-09-29 ENCOUNTER — APPOINTMENT (OUTPATIENT)
Dept: BARIATRICS/WEIGHT MGMT | Facility: CLINIC | Age: 45
End: 2020-09-29
Payer: COMMERCIAL

## 2020-09-29 VITALS — BODY MASS INDEX: 55.32 KG/M2 | HEIGHT: 61 IN | WEIGHT: 293 LBS

## 2020-09-29 PROCEDURE — 90832 PSYTX W PT 30 MINUTES: CPT | Mod: 95

## 2020-10-01 ENCOUNTER — APPOINTMENT (OUTPATIENT)
Dept: GASTROENTEROLOGY | Facility: CLINIC | Age: 45
End: 2020-10-01

## 2020-10-01 VITALS
WEIGHT: 293 LBS | TEMPERATURE: 98 F | OXYGEN SATURATION: 97 % | SYSTOLIC BLOOD PRESSURE: 130 MMHG | DIASTOLIC BLOOD PRESSURE: 80 MMHG | HEART RATE: 86 BPM | BODY MASS INDEX: 55.32 KG/M2 | HEIGHT: 61 IN

## 2020-10-13 ENCOUNTER — APPOINTMENT (OUTPATIENT)
Dept: BARIATRICS/WEIGHT MGMT | Facility: CLINIC | Age: 45
End: 2020-10-13
Payer: COMMERCIAL

## 2020-10-13 PROCEDURE — 97803 MED NUTRITION INDIV SUBSEQ: CPT | Mod: 95

## 2020-10-14 VITALS — WEIGHT: 293 LBS | BODY MASS INDEX: 55.32 KG/M2 | HEIGHT: 61 IN

## 2020-10-27 ENCOUNTER — APPOINTMENT (OUTPATIENT)
Dept: BARIATRICS/WEIGHT MGMT | Facility: CLINIC | Age: 45
End: 2020-10-27
Payer: COMMERCIAL

## 2020-10-27 VITALS — WEIGHT: 293 LBS | BODY MASS INDEX: 55.32 KG/M2 | HEIGHT: 61 IN

## 2020-10-27 PROCEDURE — 90832 PSYTX W PT 30 MINUTES: CPT | Mod: 95

## 2020-10-28 ENCOUNTER — OUTPATIENT (OUTPATIENT)
Dept: OUTPATIENT SERVICES | Facility: HOSPITAL | Age: 45
LOS: 1 days | End: 2020-10-28
Payer: COMMERCIAL

## 2020-10-28 ENCOUNTER — APPOINTMENT (OUTPATIENT)
Dept: ULTRASOUND IMAGING | Facility: CLINIC | Age: 45
End: 2020-10-28
Payer: COMMERCIAL

## 2020-10-28 DIAGNOSIS — Z98.890 OTHER SPECIFIED POSTPROCEDURAL STATES: Chronic | ICD-10-CM

## 2020-10-28 DIAGNOSIS — Z90.721 ACQUIRED ABSENCE OF OVARIES, UNILATERAL: Chronic | ICD-10-CM

## 2020-10-28 DIAGNOSIS — E03.9 HYPOTHYROIDISM, UNSPECIFIED: ICD-10-CM

## 2020-10-28 PROCEDURE — 76536 US EXAM OF HEAD AND NECK: CPT

## 2020-10-28 PROCEDURE — 76536 US EXAM OF HEAD AND NECK: CPT | Mod: 26

## 2020-10-29 ENCOUNTER — APPOINTMENT (OUTPATIENT)
Dept: BARIATRICS | Facility: CLINIC | Age: 45
End: 2020-10-29
Payer: COMMERCIAL

## 2020-10-29 VITALS — HEIGHT: 61 IN | WEIGHT: 293 LBS | BODY MASS INDEX: 55.32 KG/M2

## 2020-10-29 PROCEDURE — 99214 OFFICE O/P EST MOD 30 MIN: CPT | Mod: 95

## 2020-10-29 NOTE — ASSESSMENT
[FreeTextEntry1] : 44 year old female with longstanding history of obesity undergoing preoperative workup for laparoscopic sleeve gastrectomy here today for follow up visit. Patient lost weight since last visit and is encouraged to continue to lose weight prior to surgery. Nutrition and exercise guidelines were reviewed with the patient. She will continue to see nutritionist and psychologist monthly. Patient plans to make appointment with Obesity Medicine. Procedure and risks reviewed. All questions answered. With regard to clearance letter for , it was recommended that she get that from the cardiologist. \par \par Continue workup for bariatric surgery\par Consult with Obesity Medicine to assist with preop weight loss\par Follow up with nutritionist and psychologist monthly\par Follow up in 2-3 months\par Call with any questions or concerns.\par

## 2020-10-29 NOTE — HISTORY OF PRESENT ILLNESS
[Home] : at home, [unfilled] , at the time of the visit. [Medical Office: ([facility])___] : at the medical office located in  [Verbal consent obtained from patient] : the patient, [unfilled] [de-identified] : 44 year old female with longstanding history of obesity undergoing workup for laparoscopic sleeve gastrectomy presents today for follow up visit. Patient lost weight since last visit. She had follow up appointments with nutritionist and psychologist and continues to make dietary changes, but finds it challenging at times.  Patient had labs drawn since last visit.  Patient saw cardiologist and will be having a stress test next month.  She also saw pulmonologist and was directed to get home sleep study. For exercise, she is walking 4-5 K steps daily and plans to resume working out with .   is requesting clearance to proceed with training patient.

## 2020-10-29 NOTE — PHYSICAL EXAM
[Obese, well nourished, in no acute distress] : obese, well nourished, in no acute distress [Normal] : affect appropriate [de-identified] : EOMI, anicteric

## 2020-10-29 NOTE — REVIEW OF SYSTEMS
[Recent Change In Weight] : ~T recent weight change [Lower Ext Edema] : lower extremity edema [Negative] : Psychiatric [Fever] : no fever [Chills] : no chills [Night Sweats] : no night sweats [Fatigue] : no fatigue [Eye Pain] : no eye pain [Red Eyes] : eyes not red [Vision Problems] : no vision problems [Dysphagia] : no dysphagia [Hoarseness] : no hoarseness [Odynophagia] : no odynophagia [Chest Pain] : no chest pain [Palpitations] : no palpitations [Leg Claudication] : no intermittent leg claudication [Shortness Of Breath] : no shortness of breath [Wheezing] : no wheezing [Cough] : no cough [Abdominal Pain] : no abdominal pain [Vomiting] : no vomiting [Reflux/Heartburn] : no reflux/ heartburn [FreeTextEntry2] : weight loss

## 2020-11-17 ENCOUNTER — APPOINTMENT (OUTPATIENT)
Dept: CARDIOLOGY | Facility: CLINIC | Age: 45
End: 2020-11-17
Payer: COMMERCIAL

## 2020-11-17 VITALS
BODY MASS INDEX: 55.32 KG/M2 | SYSTOLIC BLOOD PRESSURE: 130 MMHG | WEIGHT: 293 LBS | RESPIRATION RATE: 15 BRPM | TEMPERATURE: 98 F | HEART RATE: 80 BPM | DIASTOLIC BLOOD PRESSURE: 80 MMHG | HEIGHT: 61 IN

## 2020-11-17 PROCEDURE — 99213 OFFICE O/P EST LOW 20 MIN: CPT

## 2020-11-17 PROCEDURE — 99072 ADDL SUPL MATRL&STAF TM PHE: CPT

## 2020-11-17 PROCEDURE — 93306 TTE W/DOPPLER COMPLETE: CPT

## 2020-11-17 PROCEDURE — ZZZZZ: CPT

## 2020-11-18 ENCOUNTER — APPOINTMENT (OUTPATIENT)
Dept: BARIATRICS/WEIGHT MGMT | Facility: CLINIC | Age: 45
End: 2020-11-18
Payer: COMMERCIAL

## 2020-11-18 VITALS — BODY MASS INDEX: 55.32 KG/M2 | WEIGHT: 293 LBS | HEIGHT: 61 IN

## 2020-11-18 PROCEDURE — 97803 MED NUTRITION INDIV SUBSEQ: CPT | Mod: 95

## 2020-11-24 ENCOUNTER — APPOINTMENT (OUTPATIENT)
Dept: BARIATRICS/WEIGHT MGMT | Facility: CLINIC | Age: 45
End: 2020-11-24
Payer: COMMERCIAL

## 2020-11-24 VITALS — WEIGHT: 293 LBS | HEIGHT: 61 IN | BODY MASS INDEX: 55.32 KG/M2

## 2020-11-24 PROCEDURE — 90832 PSYTX W PT 30 MINUTES: CPT | Mod: 95

## 2020-11-27 ENCOUNTER — APPOINTMENT (OUTPATIENT)
Dept: CV DIAGNOSITCS | Facility: HOSPITAL | Age: 45
End: 2020-11-27

## 2020-12-04 ENCOUNTER — APPOINTMENT (OUTPATIENT)
Dept: GASTROENTEROLOGY | Facility: CLINIC | Age: 45
End: 2020-12-04
Payer: COMMERCIAL

## 2020-12-04 VITALS
DIASTOLIC BLOOD PRESSURE: 80 MMHG | WEIGHT: 293 LBS | TEMPERATURE: 98.2 F | OXYGEN SATURATION: 95 % | HEIGHT: 61 IN | HEART RATE: 80 BPM | SYSTOLIC BLOOD PRESSURE: 129 MMHG | BODY MASS INDEX: 55.32 KG/M2

## 2020-12-04 DIAGNOSIS — Z71.89 OTHER SPECIFIED COUNSELING: ICD-10-CM

## 2020-12-04 DIAGNOSIS — K62.5 HEMORRHAGE OF ANUS AND RECTUM: ICD-10-CM

## 2020-12-04 DIAGNOSIS — K59.00 CONSTIPATION, UNSPECIFIED: ICD-10-CM

## 2020-12-04 PROCEDURE — 99214 OFFICE O/P EST MOD 30 MIN: CPT

## 2020-12-04 PROCEDURE — 99072 ADDL SUPL MATRL&STAF TM PHE: CPT

## 2020-12-04 NOTE — HISTORY OF PRESENT ILLNESS
[de-identified] : 44-year-old female with morbid obesity. She was scheduled for sleeve gastrectomy last year but had a 10 cm ovarian cyst which was removed and was benign.\par BM's have been irregular recently goes every 2-4 days complains of constipation. She denies seeing blood in the stool. Complained of a 1 time episode back in July with rectal bleeding an toilet bowel with a moderate amount of blood. She denies any upper gastrointestinal symptoms such as heartburn or regurgitation.\par Patient had a recent sonogram that revealed a large fatty liver. LFT's-normal.\par She also had a mobile gallstone. Denies any abdominal pain, nausea, or vomiting. \par

## 2020-12-04 NOTE — PHYSICAL EXAM
[General Appearance - Alert] : alert [General Appearance - In No Acute Distress] : in no acute distress [Sclera] : the sclera and conjunctiva were normal [PERRL With Normal Accommodation] : pupils were equal in size, round, and reactive to light [Extraocular Movements] : extraocular movements were intact [Outer Ear] : the ears and nose were normal in appearance [Oropharynx] : the oropharynx was normal [Neck Appearance] : the appearance of the neck was normal [Neck Cervical Mass (___cm)] : no neck mass was observed [Jugular Venous Distention Increased] : there was no jugular-venous distention [Thyroid Diffuse Enlargement] : the thyroid was not enlarged [Thyroid Nodule] : there were no palpable thyroid nodules [] : no respiratory distress [Auscultation Breath Sounds / Voice Sounds] : lungs were clear to auscultation bilaterally [Heart Rate And Rhythm] : heart rate was normal and rhythm regular [Heart Sounds] : normal S1 and S2 [Heart Sounds Gallop] : no gallops [Murmurs] : no murmurs [Heart Sounds Pericardial Friction Rub] : no pericardial rub [Full Pulse] : the pedal pulses are present [Obese] : obese [Normal] : normal [Soft, Nontender] : the abdomen was soft and nontender [Cervical Lymph Nodes Enlarged Posterior Bilaterally] : posterior cervical [Cervical Lymph Nodes Enlarged Anterior Bilaterally] : anterior cervical [Supraclavicular Lymph Nodes Enlarged Bilaterally] : supraclavicular [Abnormal Walk] : normal gait [Nail Clubbing] : no clubbing  or cyanosis of the fingernails [Musculoskeletal - Swelling] : no joint swelling seen [Motor Tone] : muscle strength and tone were normal [Deep Tendon Reflexes (DTR)] : deep tendon reflexes were 2+ and symmetric [Sensation] : the sensory exam was normal to light touch and pinprick [No Focal Deficits] : no focal deficits [Oriented To Time, Place, And Person] : oriented to person, place, and time [Impaired Insight] : insight and judgment were intact [Affect] : the affect was normal [Firm] : not firm [Rigid] : not rigid [Rebound] : no rebound [Guarding] : no guarding

## 2020-12-04 NOTE — REVIEW OF SYSTEMS
[As Noted in HPI] : as noted in HPI [Constipation] : constipation [Negative] : Heme/Lymph [Abdominal Pain] : no abdominal pain [Vomiting] : no vomiting [Diarrhea] : no diarrhea [Heartburn] : no heartburn [Melena] : no melena

## 2020-12-04 NOTE — ASSESSMENT
[FreeTextEntry1] : 1- Constipation \par \par The causes of constipation were discussed at length We discussed : Eat three meals each day. Do not\par skip meals. Gradually increase the amount of FIBER in your diet. Choose more whole grain breads,\par cereals and rice. Select more raw fruits and vegetables eat the peel, if appropriate. Read food labels\par and look for the "dietary fiber" content of foods. Good sources have 2 grams of fiber or more. Drink six\par to eight glasses of water each day. Limit highly refined and processed foods. \par \par Instructed patient to take Benefiber daily and Colace 3 times per day. Medication reviewed side effects and adverse reactions. \par \par 2- Pre- Bariatric Surgery \par \par Patient needs EGD and Colonoscopy procedure ordered. The risks benefits alternatives and complications of the procedure/s were explained to the patient at length. The patient was agreeable and we will proceed. \par \par Preparation for colonoscopy procedure discussed at length reviewed side effects and adverse reactions to prep. \par \par Patient verbalized understanding of all information provided. All questions answered and reviewed. \par \par 3- Fatty Liver \par The patient received education including the attached :\par \par Nonalcoholic fatty liver disease (NAFLD) is an umbrella term for a range of liver conditions affecting people who drink little to no alcohol. As the name implies, the main characteristic of NAFLD is too much fat stored in liver cells.\par NAFLD is increasingly common around the world, especially in Western nations. In the [place], it is the most common form of chronic liver disease, affecting about one-quarter of the population.\par Some individuals with NAFLD can develop nonalcoholic steatohepatitis (JETER), an aggressive form of fatty liver disease, which is marked by liver inflammation and may progress to advanced scarring (cirrhosis) and liver failure. This damage is similar to the damage caused by heavy alcohol use.\par \par Choose a healthy diet. Choose a healthy plant-based diet that's rich in fruits, vegetables, whole grains and healthy fats. Maintain a healthy weight. If you are overweight or obese, reduce the number of calories you eat each day and get more exercise. If you have a healthy weight, work to maintain it by choosing a healthy diet and exercising. Exercise. Exercise most days of the week. Get an OK from your doctor first if you haven't been exercising regularly\par \par We will repeat blood tests in 6 months and a sonogram yearly\par

## 2020-12-07 ENCOUNTER — APPOINTMENT (OUTPATIENT)
Dept: BARIATRICS/WEIGHT MGMT | Facility: CLINIC | Age: 45
End: 2020-12-07
Payer: COMMERCIAL

## 2020-12-07 VITALS — HEIGHT: 61 IN | WEIGHT: 293 LBS | BODY MASS INDEX: 55.32 KG/M2

## 2020-12-07 VITALS — BODY MASS INDEX: 55.32 KG/M2 | WEIGHT: 293 LBS | HEIGHT: 61 IN

## 2020-12-07 PROCEDURE — 99204 OFFICE O/P NEW MOD 45 MIN: CPT | Mod: 95

## 2020-12-07 NOTE — HISTORY OF PRESENT ILLNESS
[Home] : at home, [unfilled] , at the time of the visit. [Verbal consent obtained from patient] : the patient, [unfilled] [Other Location: e.g. Home (Enter Location, City,State)___] : at [unfilled] [FreeTextEntry1] : 45F PMH obesity, hypothyroidism, prediabetes, NAFLD, dyslipidemia, venous insufficiency who presents as a new patient to Obesity Medicine for Evaluation\par \par She takes spironolactone and furosemide for LE swelling likely in the setting of venous insufficiency. She had DVT r/o, and recent EKG and TTE as part of pre-surgical work-up which were unremarkable. \par \par She is referred by bariatric surgery for weight loss prior to surgery\par \par Weight/Diet History: She has steadily gained weight throughout her life. She notes 180 lbs in high school, 250 in her 20's, 300 in her 30's, and a high of 365 lbs in her 40's. She took OTC weight loss pills in 2019, and saw a dietician starting in 2020. She recently lost 10 lbs over a month, got discouraged and gained 8 lbs back. She has a goal to lose 60 lbs prior to surgery.\par \par Weight today: 372 lbs\par \par Diet: Gets up at 9 am.\par B: Skips\par L: (2 pm) Often has a bagel sandwich, roast beef, with butter or cheese\par D: (5:30 or 9 pm) Earlier if at her grandma's and includes BBQ chicken and vegetables\par Snack: At night\par Beverages: Used to drink soda "like water", stopped, restarted.\par Night-time eating: Potato chips or fruit\par Fast-food/Restaurants: [employer], pizza, Chinese\par Food Journal: Stopped\par \par Exercise: Used to walk a few times per week. Now walks around the store.\par \par Sleep: VAHE nose piece and doesn't wear it, waiting for new sleep study. Usually sleeps on her side. Gets up 3-4 times per night, usually to go to the bathroom or can't sleep.

## 2020-12-07 NOTE — REASON FOR VISIT
[Initial Consultation] : an initial consultation for [Hyperlipidemia] : hyperlipidemia [Obesity] : obesity [Obstructive Sleep Apena] : obstructive sleep apena

## 2020-12-07 NOTE — REVIEW OF SYSTEMS
[Negative] : Allergic/Immunologic [MED-ROS-ENT-FT] : nusrat [MED-ROS-Musclo-FT] : joint pains associated with weight gain

## 2020-12-07 NOTE — ASSESSMENT
[FreeTextEntry1] : 45F PMH obesity, hypothyroidism, prediabetes, NAFLD, dyslipidemia, venous insufficiency who presents as a new patient to Obesity Medicine for Evaluation\par \par # Obesity c/b hypothyroidism, pre-DM, NAFLD, dyslipidemia, venous insufficiency: Patient has weight gain in the setting of significant soda intake and take-out (pizza, chinese, McDonalds), as well as processed carbohydrates and saturated fats. Sedentary and VAHE untreated\par - Restart food log\par - Encouraged that original pace of weight loss is okay.\par - C/w nutrition. Would particularly benefit from minimizing soda and take-out food. Advised that she might find cutting out cola easier if she supplemented with black tea, as caffeine withdrawal may make it more difficult. \par - Will discuss physical activity more at next visit. \par - F/u sleep specialist.\par - Discussed medication. Given current known gallstones and reporting that she may have them removed during her upcoming endoscopy/colonoscopy, will hold off GLP-1 at this time for acute pancreatitis risk. Given no cardiac findings, and normal TTE and EKG, will start low-dose Phentermine 15 mg, with possible increase versus low-dose Topiramate at future visit.\par \par \par Bariatric Surgery History: None\par \par Obesity Comorbidities: Pre-DM, NAFLD, dyslipidemia, venous insufficiency, hypothyroidism\par \par Obesity Comorbidity resolution or improvement: N/A\par \par Anti-Obesity Medications: None\par \par Obesity Medication Side Effects: N/A

## 2020-12-07 NOTE — PHYSICAL EXAM
[Obese, well nourished, in no acute distress] : obese, well nourished, in no acute distress [de-identified] : TEB appointment

## 2020-12-16 ENCOUNTER — APPOINTMENT (OUTPATIENT)
Dept: BARIATRICS/WEIGHT MGMT | Facility: CLINIC | Age: 45
End: 2020-12-16
Payer: COMMERCIAL

## 2020-12-16 VITALS — HEIGHT: 61 IN | WEIGHT: 293 LBS | BODY MASS INDEX: 55.32 KG/M2

## 2020-12-16 PROCEDURE — 97803 MED NUTRITION INDIV SUBSEQ: CPT | Mod: 95

## 2020-12-22 ENCOUNTER — APPOINTMENT (OUTPATIENT)
Dept: BARIATRICS/WEIGHT MGMT | Facility: CLINIC | Age: 45
End: 2020-12-22
Payer: COMMERCIAL

## 2020-12-22 VITALS — HEIGHT: 61 IN | BODY MASS INDEX: 55.32 KG/M2 | WEIGHT: 293 LBS

## 2020-12-22 PROCEDURE — 90832 PSYTX W PT 30 MINUTES: CPT | Mod: 95

## 2020-12-23 PROBLEM — Z12.11 ENCOUNTER FOR SCREENING COLONOSCOPY: Status: RESOLVED | Noted: 2020-05-20 | Resolved: 2020-12-23

## 2021-01-04 ENCOUNTER — APPOINTMENT (OUTPATIENT)
Dept: BARIATRICS/WEIGHT MGMT | Facility: CLINIC | Age: 46
End: 2021-01-04
Payer: COMMERCIAL

## 2021-01-04 VITALS — WEIGHT: 293 LBS | BODY MASS INDEX: 55.32 KG/M2 | HEIGHT: 61 IN

## 2021-01-04 PROCEDURE — 99443: CPT

## 2021-01-04 NOTE — HISTORY OF PRESENT ILLNESS
[Home] : at home, [unfilled] , at the time of the visit. [Medical Office: ([facility])___] : at the medical office located in  [Verbal consent obtained from patient] : the patient, [unfilled] [FreeTextEntry1] : 45F PMH obesity, hypothyroidism, prediabetes, NAFLD, dyslipidemia, venous insufficiency who presents to Obesity Medicine for follow-up\par \par Patient initially presented at her last visit on 12/7/20 with goal for pre-surgical weight loss. \par \par Weight today: 351 (reported), BMI 66.32\par Weight at initial visit: 372 lbs (reported), BMI 70.29\par \par Diet: Notes that she is eating 2-3 meals a day, still skips breakfast 4+ days per week. But eats dinner at 6 pm and does not eat again before bedtime at midnight. Notes eating vegetables and chicken or steak for dinner. Lunch usually a sandwich and cucumbers. She is planning to buy tuna and salads. She is keeping a food log, she cut soda out of her diet.\par \par Exercise: Started increasing her physical activity.\par \par Sleep: VAHE nose piece and doesn't wear it, waiting for new sleep study. Sleep is improving with weight loss, getting up less in the night.

## 2021-01-04 NOTE — ASSESSMENT
[FreeTextEntry1] : 45F PMH obesity, hypothyroidism, prediabetes, NAFLD, dyslipidemia, venous insufficiency who presents to Obesity Medicine for follow-up\par \par # Obesity c/b pre-dm, NAFLD, dyslipidemia, venous insufficiency, hypothyroid: Weight today is 351 lbs, BMI 66.32. She lost 21 lbs since last visit. She has been tolerating Phentermine 15 mg with no side effects and feels it is controlling her appetite. Her goal is to lose another ~40 lbs prior to bariatric surgery. . She has cut soda out of her diet, is keeping a food log, not eating between dinner and bedtime. Increasing physical activity, sleeping better.\par - C/w food log\par - C/w dietary habit changes, avoid eating at night, avoid soda.\par - C/w improvements in physical activity\par - F/u sleep study\par - Will c/w Phentermine 15 mg for now, since she is losing weight rapidly (>5 lbs per week) and her appetite has been controlled and she's made dietary/behavioral improvements on this dose. We discussed possibly increasing the dose at future visit if her rate of weight loss slows down or effect is diminished. Alternatively, she may benefit from adding Topiramate.\par - RTC in 3-4 weeks.

## 2021-01-08 ENCOUNTER — APPOINTMENT (OUTPATIENT)
Dept: BARIATRICS | Facility: CLINIC | Age: 46
End: 2021-01-08
Payer: COMMERCIAL

## 2021-01-08 VITALS — BODY MASS INDEX: 55.32 KG/M2 | HEIGHT: 61 IN | WEIGHT: 293 LBS

## 2021-01-08 DIAGNOSIS — R63.4 ABNORMAL WEIGHT LOSS: ICD-10-CM

## 2021-01-08 DIAGNOSIS — R63.5 ABNORMAL WEIGHT GAIN: ICD-10-CM

## 2021-01-08 PROCEDURE — 99213 OFFICE O/P EST LOW 20 MIN: CPT | Mod: 95

## 2021-01-08 NOTE — ASSESSMENT
[FreeTextEntry1] : 45 year old female with longstanding history of obesity undergoing preoperative workup for laparoscopic sleeve gastrectomy here today for follow up visit. Patient lost weight since last visit and is encouraged to continue to lose weight prior to surgery. Nutrition and exercise guidelines were reviewed with the patient. She will continue to see Obesity Medicine MD and nutritionist and psychologist monthly.  Procedure and risks reviewed. All questions answered.\par \par Continue workup for bariatric surgery\par Continue follow up Obesity Medicine to assist with preop weight loss\par Follow up with nutritionist and psychologist monthly\par Follow up in 2 months\par Call with any questions or concerns.\par

## 2021-01-08 NOTE — PHYSICAL EXAM
[Obese, well nourished, in no acute distress] : obese, well nourished, in no acute distress [Normal] : affect appropriate [de-identified] : EOMI, anicteric

## 2021-01-08 NOTE — HISTORY OF PRESENT ILLNESS
[Home] : at home, [unfilled] , at the time of the visit. [Medical Office: ([facility])___] : at the medical office located in  [Verbal consent obtained from patient] : the patient, [unfilled] [de-identified] : 45 year old female with longstanding history of obesity undergoing workup for laparoscopic sleeve gastrectomy presents today for follow up visit. Patient lost weight since last visit. She had follow up appointments with nutritionist and psychologist and continues to make dietary changes - she is no longer having bread, pasta and soda. Patient also met with Obesity Medicine and started medication just before Sandi. For exercise, she walking 5 K steps daily and is now doing exercise video for 10 min three times a week and just started jogging in place for 15 min (5 min x 3). She did not start working with  because she did not yet get clearance from PCP to proceed with training patient. Patient still needs stress test and sleep study.  She has upper EGD and colonoscopy next week. Patient recently lost job.

## 2021-01-12 ENCOUNTER — APPOINTMENT (OUTPATIENT)
Dept: GASTROENTEROLOGY | Facility: CLINIC | Age: 46
End: 2021-01-12

## 2021-01-12 DIAGNOSIS — Z20.822 CONTACT WITH AND (SUSPECTED) EXPOSURE TO COVID-19: ICD-10-CM

## 2021-01-13 ENCOUNTER — NON-APPOINTMENT (OUTPATIENT)
Age: 46
End: 2021-01-13

## 2021-01-13 ENCOUNTER — APPOINTMENT (OUTPATIENT)
Dept: CARDIOLOGY | Facility: CLINIC | Age: 46
End: 2021-01-13
Payer: COMMERCIAL

## 2021-01-13 VITALS
HEIGHT: 61 IN | SYSTOLIC BLOOD PRESSURE: 128 MMHG | BODY MASS INDEX: 55.32 KG/M2 | HEART RATE: 90 BPM | DIASTOLIC BLOOD PRESSURE: 78 MMHG | WEIGHT: 293 LBS | RESPIRATION RATE: 16 BRPM

## 2021-01-13 DIAGNOSIS — Z01.810 ENCOUNTER FOR PREPROCEDURAL CARDIOVASCULAR EXAMINATION: ICD-10-CM

## 2021-01-13 PROCEDURE — 99213 OFFICE O/P EST LOW 20 MIN: CPT

## 2021-01-13 PROCEDURE — 93000 ELECTROCARDIOGRAM COMPLETE: CPT | Mod: NC

## 2021-01-13 PROCEDURE — 99072 ADDL SUPL MATRL&STAF TM PHE: CPT

## 2021-01-14 LAB — SARS-COV-2 N GENE NPH QL NAA+PROBE: NOT DETECTED

## 2021-01-15 ENCOUNTER — RESULT REVIEW (OUTPATIENT)
Age: 46
End: 2021-01-15

## 2021-01-15 ENCOUNTER — OUTPATIENT (OUTPATIENT)
Dept: OUTPATIENT SERVICES | Facility: HOSPITAL | Age: 46
LOS: 1 days | Discharge: HOME | End: 2021-01-15
Payer: COMMERCIAL

## 2021-01-15 ENCOUNTER — APPOINTMENT (OUTPATIENT)
Dept: GASTROENTEROLOGY | Facility: HOSPITAL | Age: 46
End: 2021-01-15
Payer: COMMERCIAL

## 2021-01-15 VITALS
OXYGEN SATURATION: 99 % | DIASTOLIC BLOOD PRESSURE: 45 MMHG | RESPIRATION RATE: 17 BRPM | SYSTOLIC BLOOD PRESSURE: 121 MMHG | HEART RATE: 85 BPM | TEMPERATURE: 99 F

## 2021-01-15 VITALS
DIASTOLIC BLOOD PRESSURE: 79 MMHG | OXYGEN SATURATION: 100 % | RESPIRATION RATE: 18 BRPM | HEART RATE: 75 BPM | SYSTOLIC BLOOD PRESSURE: 126 MMHG

## 2021-01-15 DIAGNOSIS — Z90.721 ACQUIRED ABSENCE OF OVARIES, UNILATERAL: Chronic | ICD-10-CM

## 2021-01-15 DIAGNOSIS — Z98.890 OTHER SPECIFIED POSTPROCEDURAL STATES: Chronic | ICD-10-CM

## 2021-01-15 DIAGNOSIS — Z71.89 OTHER SPECIFIED COUNSELING: ICD-10-CM

## 2021-01-15 LAB — HCG UR QL: NEGATIVE — SIGNIFICANT CHANGE UP

## 2021-01-15 PROCEDURE — 88312 SPECIAL STAINS GROUP 1: CPT | Mod: 26

## 2021-01-15 PROCEDURE — 45378 DIAGNOSTIC COLONOSCOPY: CPT | Mod: 33

## 2021-01-15 PROCEDURE — 88305 TISSUE EXAM BY PATHOLOGIST: CPT | Mod: 26

## 2021-01-15 PROCEDURE — 43239 EGD BIOPSY SINGLE/MULTIPLE: CPT

## 2021-01-15 RX ORDER — SODIUM CHLORIDE 9 MG/ML
1000 INJECTION, SOLUTION INTRAVENOUS
Refills: 0 | Status: DISCONTINUED | OUTPATIENT
Start: 2021-01-15 | End: 2021-01-29

## 2021-01-15 RX ADMIN — SODIUM CHLORIDE 30 MILLILITER(S): 9 INJECTION, SOLUTION INTRAVENOUS at 14:06

## 2021-01-15 NOTE — ASU PREOP CHECKLIST - WEIGHT IN KG
Attempted to contact patient 2 times to schedule a colonoscopy. No response. Letter sent.    Routing to Jaison Sahu MD for notification      [phone]

## 2021-01-21 LAB — SURGICAL PATHOLOGY STUDY: SIGNIFICANT CHANGE UP

## 2021-01-22 ENCOUNTER — APPOINTMENT (OUTPATIENT)
Dept: ENDOCRINOLOGY | Facility: CLINIC | Age: 46
End: 2021-01-22
Payer: COMMERCIAL

## 2021-01-22 VITALS
SYSTOLIC BLOOD PRESSURE: 122 MMHG | DIASTOLIC BLOOD PRESSURE: 70 MMHG | TEMPERATURE: 98.1 F | HEIGHT: 61 IN | BODY MASS INDEX: 55.32 KG/M2 | WEIGHT: 293 LBS

## 2021-01-22 DIAGNOSIS — E03.9 HYPOTHYROIDISM, UNSPECIFIED: ICD-10-CM

## 2021-01-22 DIAGNOSIS — E55.9 VITAMIN D DEFICIENCY, UNSPECIFIED: ICD-10-CM

## 2021-01-22 PROCEDURE — 99072 ADDL SUPL MATRL&STAF TM PHE: CPT

## 2021-01-22 PROCEDURE — 99214 OFFICE O/P EST MOD 30 MIN: CPT

## 2021-01-22 NOTE — ASSESSMENT
[FreeTextEntry1] : Patient is a 45-year-old female with morbid obesity, prediabetes, hypothyroidism here for endocrinology follow-up.\par \par 1.  Hypothyroidism\par Clinically euthyroid. \par She is currently taking Lt4 125mcg daily \par Check TFT and adjust dosage as needed. \par \par 2.  Prediabetes\par A1C was 6.2% in Sept 2020, she's working on diet and exercise.  She is doing 7000 steps daily, eating better, seeing weight management and looking forward for gastric sleeve surgery. \par \par 3.  Obesity\par Working with weight management and bariatric surgery. \par  \par 4.  Vitamin D deficiency.\par Check Vitmain D level, currenlty taking Vitamin D 2000 IU mariel.

## 2021-01-22 NOTE — PHYSICAL EXAM
[Alert] : alert [Well Nourished] : well nourished [Obese] : obese [No Acute Distress] : no acute distress [Well Developed] : well developed [Normal Sclera/Conjunctiva] : normal sclera/conjunctiva [EOMI] : extra ocular movement intact [No Proptosis] : no proptosis [Normal Oropharynx] : the oropharynx was normal [Thyroid Not Enlarged] : the thyroid was not enlarged [No Thyroid Nodules] : no palpable thyroid nodules [No Respiratory Distress] : no respiratory distress [No Accessory Muscle Use] : no accessory muscle use [Clear to Auscultation] : lungs were clear to auscultation bilaterally [Normal S1, S2] : normal S1 and S2 [Normal Rate] : heart rate was normal [Regular Rhythm] : with a regular rhythm [Normal Bowel Sounds] : normal bowel sounds [Not Tender] : non-tender [Not Distended] : not distended [Soft] : abdomen soft [Normal Anterior Cervical Nodes] : no anterior cervical lymphadenopathy [No Stigmata of Cushings Syndrome] : no stigmata of Cushings Syndrome [Normal Gait] : normal gait [Normal Strength/Tone] : muscle strength and tone were normal [No Rash] : no rash [Normal Reflexes] : deep tendon reflexes were 2+ and symmetric [No Tremors] : no tremors [Oriented x3] : oriented to person, place, and time [Acanthosis Nigricans] : no acanthosis nigricans

## 2021-01-22 NOTE — HISTORY OF PRESENT ILLNESS
[FreeTextEntry1] : Ms. SHWETA GAGNON is 45 year old female with history of hypothyroidism, obesity here to establish care for hypothyroidism. \par Diagnosed 4-5 years ago.  Patient states that her hypothyroidism is usually managed by her primary care doctor Dr. Delgado.  She was previously taking levothyroxine 100 mcg once daily.  \par \par She takes the medication correctly.  She stated that in August 2020, the dosage was increased from 100 mcg to 125 mcg once daily. She had blood work done with her cardiologist in September 16, 2020. Found to have normal TSH level of 2.59, free T4 level of 1.7\par She is now taking LT4 125mcg daily.  \par \par Patient is currently under evaluation of bariatric surgery for gastric sleeve surgery.  She's seeing weight management.  She was started on Phentermine 15mg once daily by weight management.  \par \par Patient with history of bilateral lower extremity swellings.  She is currently taking spironolactone 25 mg once daily, Lasix 40 mg as needed for lower extremity swelling.\par \par Patient also with a history of vitamin D deficiency, was recently started on vitamin D supplementation. She's taking Vitamin D 2000 IU daily.  \par \par Patient stated that her weight gain is likely due to stress, after the passing of her mother, she had emotional eating, thus leading to obesity.  Her current BMI is 68.78.

## 2021-01-26 LAB
25(OH)D3 SERPL-MCNC: 32.3 NG/ML
ESTIMATED AVERAGE GLUCOSE: 131 MG/DL
HBA1C MFR BLD HPLC: 6.2 %
T4 FREE SERPL-MCNC: 1.6 NG/DL
TSH SERPL-ACNC: 2.07 UIU/ML

## 2021-01-28 ENCOUNTER — APPOINTMENT (OUTPATIENT)
Dept: BARIATRICS/WEIGHT MGMT | Facility: CLINIC | Age: 46
End: 2021-01-28
Payer: COMMERCIAL

## 2021-01-28 VITALS — WEIGHT: 293 LBS | HEIGHT: 61 IN | BODY MASS INDEX: 55.32 KG/M2

## 2021-01-28 PROCEDURE — 90832 PSYTX W PT 30 MINUTES: CPT | Mod: 95

## 2021-02-04 ENCOUNTER — APPOINTMENT (OUTPATIENT)
Dept: BARIATRICS/WEIGHT MGMT | Facility: CLINIC | Age: 46
End: 2021-02-04
Payer: SELF-PAY

## 2021-02-04 VITALS — BODY MASS INDEX: 55.32 KG/M2 | HEIGHT: 61 IN | WEIGHT: 293 LBS

## 2021-02-04 PROCEDURE — 97803 MED NUTRITION INDIV SUBSEQ: CPT | Mod: 95

## 2021-02-17 ENCOUNTER — APPOINTMENT (OUTPATIENT)
Dept: CARDIOLOGY | Facility: CLINIC | Age: 46
End: 2021-02-17
Payer: COMMERCIAL

## 2021-02-17 VITALS
BODY MASS INDEX: 55.32 KG/M2 | HEART RATE: 82 BPM | WEIGHT: 293 LBS | HEIGHT: 61 IN | TEMPERATURE: 97.7 F | RESPIRATION RATE: 15 BRPM | DIASTOLIC BLOOD PRESSURE: 82 MMHG | SYSTOLIC BLOOD PRESSURE: 133 MMHG

## 2021-02-17 DIAGNOSIS — Z01.818 ENCOUNTER FOR OTHER PREPROCEDURAL EXAMINATION: ICD-10-CM

## 2021-02-17 DIAGNOSIS — R06.00 DYSPNEA, UNSPECIFIED: ICD-10-CM

## 2021-02-17 PROCEDURE — 93015 CV STRESS TEST SUPVJ I&R: CPT

## 2021-02-17 PROCEDURE — 99072 ADDL SUPL MATRL&STAF TM PHE: CPT

## 2021-02-17 PROCEDURE — 99213 OFFICE O/P EST LOW 20 MIN: CPT | Mod: 25

## 2021-02-18 RX ORDER — DOCUSATE SODIUM 100 MG/1
100 CAPSULE ORAL 3 TIMES DAILY
Qty: 90 | Refills: 0 | Status: COMPLETED | COMMUNITY
Start: 2020-12-04 | End: 2021-02-18

## 2021-02-18 RX ORDER — SODIUM PICOSULFATE, MAGNESIUM OXIDE, AND ANHYDROUS CITRIC ACID 10; 3.5; 12 MG/160ML; G/160ML; G/160ML
10-3.5-12 MG-GM LIQUID ORAL
Qty: 1 | Refills: 0 | Status: COMPLETED | COMMUNITY
Start: 2020-12-04 | End: 2021-02-18

## 2021-02-18 RX ORDER — WHEAT DEXTRIN 3 G/4 G
POWDER (GRAM) ORAL
Qty: 1 | Refills: 3 | Status: COMPLETED | COMMUNITY
Start: 2020-12-04 | End: 2021-02-18

## 2021-02-22 ENCOUNTER — APPOINTMENT (OUTPATIENT)
Dept: BARIATRICS/WEIGHT MGMT | Facility: CLINIC | Age: 46
End: 2021-02-22
Payer: COMMERCIAL

## 2021-02-22 PROCEDURE — 99442: CPT

## 2021-02-22 NOTE — HISTORY OF PRESENT ILLNESS
[Home] : at home, [unfilled] , at the time of the visit. [Medical Office: ([facility])___] : at the medical office located in  [FreeTextEntry1] : 45F PMH obesity, hypothyroidism, prediabetes, NAFLD, dyslipidemia, venous insufficiency who presents to Obesity Medicine for follow-up\par \par Patient initially presented at her last visit on 12/7/20 with goal for pre-surgical weight loss. \par \par Weight today: 369 lbs, BMI 69.72\par Weight at last visit (1/4/21): 351 (reported), BMI 66.32\par Weight at initial visit (12/7/20): 372 lbs (reported), BMI 70.29\par \par Patient states that her weight increased back up to 374 lbs until her Phentermine dose was increased, she has since lost 5 lbs. \par \par Diet: Eating healthier, trying to have smoothies and fruits. Eating 3 meals per day. Not keeping food log now. Sometimes snack in the evening on cucumbers or carrots. \par \par Exercise: Starting [employer] exercise videos and will be going on walks\par \par Sleep: Sleep is okay, has not been able to get a new sleep study yet.

## 2021-02-22 NOTE — ASSESSMENT
[FreeTextEntry1] : 45F PMH obesity, hypothyroidism, prediabetes, NAFLD, dyslipidemia, venous insufficiency who presents to Obesity Medicine for follow-up\par \par # Obesity c/b pre-dm, NAFLD, dyslipidemia, venous insufficiency, hypothyroid: Weight today 369 lbs, BMI 69.72. Patient re-gained all of weight after >20 lb weight loss, but now has re-lost 5 lbs again with an increase in Phentermine dosing. Sedentary but notes intention to do appropriate activities, no sleep study yet. \par - Keep food log\par - C/w dietician and psychology\par - Aim for increase in physical activity\par - F/u sleep study\par - C/w Phentermine 30 mg, will add Topiramate (25 mg qhs, increase to 50 mg qhs after one week). Low threshold to increase Phentermine to 37.5 mg if she does not maintain weight loss with addition of Topiramate. She may ultimately benefit from GLP or alternative treatments additionally if weight loss is not sustained.\par - RTC in 3-4 weeks. \par

## 2021-03-04 ENCOUNTER — APPOINTMENT (OUTPATIENT)
Dept: BARIATRICS/WEIGHT MGMT | Facility: CLINIC | Age: 46
End: 2021-03-04
Payer: COMMERCIAL

## 2021-03-04 VITALS — BODY MASS INDEX: 55.32 KG/M2 | WEIGHT: 293 LBS | HEIGHT: 61 IN

## 2021-03-04 PROCEDURE — 90832 PSYTX W PT 30 MINUTES: CPT | Mod: 95

## 2021-03-09 ENCOUNTER — APPOINTMENT (OUTPATIENT)
Age: 46
End: 2021-03-09

## 2021-03-15 NOTE — REASON FOR VISIT
[Follow-Up Visit] : a follow-up visit for [Hyperlipidemia] : hyperlipidemia [Metabolic Syndrome] : metabolic syndrome [Obesity] : obesity

## 2021-03-16 ENCOUNTER — APPOINTMENT (OUTPATIENT)
Dept: BARIATRICS/WEIGHT MGMT | Facility: CLINIC | Age: 46
End: 2021-03-16
Payer: COMMERCIAL

## 2021-03-16 VITALS — WEIGHT: 293 LBS | HEIGHT: 61 IN | BODY MASS INDEX: 55.32 KG/M2

## 2021-03-16 PROCEDURE — 99443: CPT

## 2021-03-16 NOTE — HISTORY OF PRESENT ILLNESS
[Home] : at home, [unfilled] , at the time of the visit. [Medical Office: ([facility])___] : at the medical office located in  [Verbal consent obtained from patient] : the patient, [unfilled] [FreeTextEntry1] : 45F PMH obesity, hypothyroidism, prediabetes, NAFLD, dyslipidemia, venous insufficiency who presents to Obesity Medicine for follow-up\par \par Patient initially presented on 12/7/20 with goal for pre-surgical weight loss. \par \par Weight today: 361 lbs, BMI 68.21\par Weight at last visit (2/22/21): 369 lbs, BMI 69.72\par Weight at initial visit (12/7/20): 372 lbs (reported), BMI 70.29\par \par Diet: Notes a diet that is high in protein (tuna, chicken, meat, protein shakes), eats vegetables. She has 3 meals per day, protein shake for breakfast, dinner at 6 pm, minimizes snacks. \par \par Exercise: Going on walks 3x/week for 15 minutes.\par \par Sleep: Sleep is okay, has not been able to get a new sleep study yet. \par \par Medication: Notes no side effects from Phentermine. Said she felt a little sleepy the first day she took Topiramate but normal after that. Of note, she reports that she was only filled for a prescription of 30 pills of Topiramate 25 mg, she ran out a few days ago.

## 2021-03-16 NOTE — ASSESSMENT
[FreeTextEntry1] : 45F PMH obesity, hypothyroidism, prediabetes, NAFLD, dyslipidemia, venous insufficiency who presents to Obesity Medicine for follow-up\par \par # Obesity c/b pre-dm, NAFLD, dyslipidemia, venous insufficiency, hypothyroid: Weight today 361 lbs, BMI 68.21. Focusing on high protein foods and vegetables, portioned, short walks. Tolerating medication well but underdosed and ran out of topiramate\par - Food log\par - C/w dietician and psychology\par - C/w physical activity\par - F/u sleep study\par - C/w Phentermine 30 mg, restart Topiramate 25 mg qhs and increase to 50 mg qhs in a few days. She may ultimately benefit from GLP or alternative treatments additionally if weight loss is not sustained.\par - RTC in 3-4 weeks. \par \par  \par \par Bariatric Surgery History: No\par \par Obesity Comorbidities: hypothyroidism, prediabetes, NAFLD, dyslipidemia, venous insufficiency\par \par Obesity Comorbidity resolution or improvement: \par \par Anti-Obesity Medications: Phentermine 30 mg, topiramate 25 mg\par \par Obesity Medication Side Effects: None\par \par \par

## 2021-03-19 ENCOUNTER — APPOINTMENT (OUTPATIENT)
Dept: BARIATRICS/WEIGHT MGMT | Facility: CLINIC | Age: 46
End: 2021-03-19
Payer: COMMERCIAL

## 2021-03-19 VITALS — BODY MASS INDEX: 55.32 KG/M2 | HEIGHT: 61 IN | WEIGHT: 293 LBS

## 2021-03-19 PROCEDURE — 97803 MED NUTRITION INDIV SUBSEQ: CPT | Mod: 95

## 2021-04-12 ENCOUNTER — APPOINTMENT (OUTPATIENT)
Dept: BARIATRICS/WEIGHT MGMT | Facility: CLINIC | Age: 46
End: 2021-04-12
Payer: COMMERCIAL

## 2021-04-12 VITALS — WEIGHT: 293 LBS | BODY MASS INDEX: 55.32 KG/M2 | HEIGHT: 61 IN

## 2021-04-12 PROCEDURE — 90832 PSYTX W PT 30 MINUTES: CPT | Mod: 95

## 2021-04-23 ENCOUNTER — APPOINTMENT (OUTPATIENT)
Dept: BARIATRICS/WEIGHT MGMT | Facility: CLINIC | Age: 46
End: 2021-04-23
Payer: COMMERCIAL

## 2021-04-23 PROCEDURE — 97803 MED NUTRITION INDIV SUBSEQ: CPT | Mod: 95

## 2021-04-26 ENCOUNTER — APPOINTMENT (OUTPATIENT)
Dept: CARDIOLOGY | Facility: CLINIC | Age: 46
End: 2021-04-26
Payer: COMMERCIAL

## 2021-04-26 ENCOUNTER — NON-APPOINTMENT (OUTPATIENT)
Age: 46
End: 2021-04-26

## 2021-04-26 VITALS
SYSTOLIC BLOOD PRESSURE: 128 MMHG | OXYGEN SATURATION: 96 % | TEMPERATURE: 97.9 F | BODY MASS INDEX: 55.32 KG/M2 | HEIGHT: 61 IN | DIASTOLIC BLOOD PRESSURE: 78 MMHG | HEART RATE: 90 BPM | WEIGHT: 293 LBS | RESPIRATION RATE: 16 BRPM

## 2021-04-26 PROCEDURE — 99072 ADDL SUPL MATRL&STAF TM PHE: CPT

## 2021-04-26 PROCEDURE — 93224 XTRNL ECG REC UP TO 48 HRS: CPT

## 2021-04-26 PROCEDURE — 93000 ELECTROCARDIOGRAM COMPLETE: CPT | Mod: 59

## 2021-04-26 PROCEDURE — 99213 OFFICE O/P EST LOW 20 MIN: CPT

## 2021-04-26 NOTE — REASON FOR VISIT
[Follow-Up - Clinic] : a clinic follow-up of [Dyspnea] : dyspnea [Arrhythmia/ECG Abnorrmalities] : arrhythmia/ECG abnormalities [Hypertension] : hypertension [Hyperlipidemia] : hyperlipidemia [FreeTextEntry1] : murmur

## 2021-04-26 NOTE — DISCUSSION/SUMMARY
[FreeTextEntry1] : Dr. Saab-(PRIOR VISIT and PMH WITH Dr. Saab): \par This is a 45-year-old female past medical history significant for hyperglycemia, obesity, sleep apnea, ovarian cyst, status post hernia repair, status post right oophorectomy, status post meniscus repair, hypothyroidism, who comes in for cardiac preoperative follow-up evaluation.  \par She denies chest pain, shortness of breath, dizziness or syncope.\par \par The patient had a normal modified Julio Cesar protocol exercise stress test February 17, 2021.\par \par The patient had a borderline hypertensive response to exercise, and occasional premature ventricular contractions.\par \par She will start on Toprol-XL 25 mg in the evening.\par She had an echo Doppler examination done today which demonstrated an ejection fraction of 65%.\par \par The patient is currently on Lasix 40 mg daily, Synthroid 125 mcg a day, Aldactone 25 mg daily.\par Electrocardiogram done September 16, 2020 demonstrated normal sinus rhythm rate 68 bpm is otherwise unremarkable.\par \par \par This patient is cleared from a cardiac standpoint for bariatric surgery pending acceptable preoperative laboratory values.  The patient should be allowed to take her usual medications in the perioperative period.  Please avoid overhydration.  Maintain a normal potassium level.  Maintain prophylaxis for deep venous thrombosis.  The patient should have an incentive spirometer in the perioperative period.

## 2021-04-26 NOTE — ASSESSMENT
[FreeTextEntry1] : This is a 45-year-old female past medical history significant for pre-diabetes, obesity, sleep apnea, ovarian cyst, status post hernia repair, status post right oophorectomy, status post meniscus repair, hypothyroidism, who comes in for follow up. \par \par She feels well today and does not have chest pains at this time. She states that she is planning on having bariatric surgery and needs to have a sleep apnea evaluation which we can do for her in our office. She does get SOB at times on exertion but this appears to be related to her body habitus.\par \par -Pt is stable from a cardiac standpoint and does not have any complaints at this time. \par \par BLOOD PRESSURE:\par -BP is well controlled in today's visit and patient is on Metoprolol ER 25mg PO DAILY, furosemide 40mg PO DAILY and on Spironolactone 25mg PO DAILY. \par \par BLOOD WORK:\par -New blood work was done 2/17/2021 which demonstrated LDL of 120. ASCVD Risk of 1.4%.\par \par CHOLESTEROL CONTROL:\par -Patient will continue the advised  TLC diet and to continue follow-up for treatment of hyperlipidemia and repeat blood testing with diet and exercise. I have discussed different exercises and the importance of maintenance of optimal body weight. The importance of staying within guidelines and recommendations was stressed to the patient today and they acknowledged that they understand this to me verbally.\par \par  -Ms. GAGNON was educated and advised that failure to follow-up with my medical recommendations to lower cholesterol can result in severe health consequences therefore, they will continuing a low saturated and low fat diet and to avoid excessive carbohydrates to help reduce triglycerides and that lowering LDL levels is associated with a significant decrease in serious cardiac events including but not limited to heart attack stroke and overall death. We will continue lipid lowering agents as advised based on blood test results and the patient understands to call if they develop severe muscle discomfort or if they have a reddish tinted discoloration in their urine.\par \par TESTING/REPORTS:\par -EKG done Apr 26, 2021 which demonstrated regular sinus rhythm with nonspecific ST-T wave changes, BPM of 81 however, there was one point during the EKG that her HR increased to 150's which we were unable to capture. \par \par -The patient had an echocardiogram done on 11/17/2020 demonstrated mild mitral tricuspid and pulmonic regurgitation with normal left ventricular systolic function. EF of 65%.\par  \par -The patient had a normal exercise stress test done on 2/17/2021.\par \par PLAN:\par -She will schedule a sleep study through her pulmonologist. \par -We will do 24 hour holter monitor to evaluate her rhythm and HR. \par -She will follow up with her bariatric doctor Renetta Jarquin.\par \par I have discussed the plan of care with Ms. SHWETA GAGNON  and she  will follow up in 1 month. She is compliant with all of her medications.\par \par The patient understands that aerobic exercises must be increased to minutes 4 times/week and a detailed discussion of lifestyle modification was done today. \par The patient has a good understanding of the diagnosis, treatment plan and lifestyle modification. \par She will contact me at the office for any questions with their care or any changes in their health status.\par \par The plan of care was discussed with supervising physician, Dr. Saab while present in the office at the time of the visit. \par \par Aruna BELTRAN

## 2021-04-26 NOTE — PHYSICAL EXAM
[General Appearance - Well Developed] : well developed [Normal Appearance] : normal appearance [Well Groomed] : well groomed [General Appearance - Well Nourished] : well nourished [No Deformities] : no deformities [General Appearance - In No Acute Distress] : no acute distress [Normal Oral Mucosa] : normal oral mucosa [Normal Oropharynx] : normal oropharynx [Normal Jugular Venous A Waves Present] : normal jugular venous A waves present [Normal Jugular Venous V Waves Present] : normal jugular venous V waves present [No Jugular Venous Medina A Waves] : no jugular venous medina A waves [Respiration, Rhythm And Depth] : normal respiratory rhythm and effort [Exaggerated Use Of Accessory Muscles For Inspiration] : no accessory muscle use [Auscultation Breath Sounds / Voice Sounds] : lungs were clear to auscultation bilaterally [Bowel Sounds] : normal bowel sounds [Abdomen Soft] : soft [Abnormal Walk] : normal gait [Gait - Sufficient For Exercise Testing] : the gait was sufficient for exercise testing [Nail Clubbing] : no clubbing of the fingernails [Cyanosis, Localized] : no localized cyanosis [Skin Color & Pigmentation] : normal skin color and pigmentation [Skin Turgor] : normal skin turgor [] : no rash [Oriented To Time, Place, And Person] : oriented to person, place, and time [Affect] : the affect was normal [Mood] : the mood was normal [No Anxiety] : not feeling anxious [5th Left ICS - MCL] : palpated at the 5th LICS in the midclavicular line [Normal] : normal [No Precordial Heave] : no precordial heave was noted [Normal Rate] : normal [Rhythm Regular] : regular [Normal S1] : normal S1 [Normal S2] : normal S2 [II] : a grade 2 [2+] : left 2+ [No Abnormalities] : the abdominal aorta was not enlarged and no bruit was heard [Nonpitting Edema] : nonpitting edema present [Well Developed] : well developed [Well Nourished] : well nourished [No Acute Distress] : no acute distress [Normal Conjunctiva] : normal conjunctiva [Normal Venous Pressure] : normal venous pressure [No Carotid Bruit] : no carotid bruit [Normal S1, S2] : normal S1, S2 [No Murmur] : no murmur [No Rub] : no rub [No Gallop] : no gallop [Clear Lung Fields] : clear lung fields [Good Air Entry] : good air entry [No Respiratory Distress] : no respiratory distress  [Soft] : abdomen soft [Non Tender] : non-tender [No Masses/organomegaly] : no masses/organomegaly [Normal Bowel Sounds] : normal bowel sounds [Normal Gait] : normal gait [No Edema] : no edema [No Cyanosis] : no cyanosis [No Clubbing] : no clubbing [No Varicosities] : no varicosities [No Rash] : no rash [No Skin Lesions] : no skin lesions [Moves all extremities] : moves all extremities [No Focal Deficits] : no focal deficits [Normal Speech] : normal speech [Alert and Oriented] : alert and oriented [Normal memory] : normal memory [S3] : no S3 [S4] : no S4 [Click] : no click [Pericardial Rub] : no pericardial rub [Right Carotid Bruit] : no bruit heard over the right carotid [Left Carotid Bruit] : no bruit heard over the left carotid [Right Femoral Bruit] : no bruit heard over the right femoral artery [Left Femoral Bruit] : no bruit heard over the left femoral artery [Rt] : no varicose veins of the right leg [Lt] : no varicose veins of the left leg

## 2021-04-29 VITALS — BODY MASS INDEX: 55.32 KG/M2 | WEIGHT: 293 LBS | HEIGHT: 61 IN

## 2021-05-03 ENCOUNTER — NON-APPOINTMENT (OUTPATIENT)
Age: 46
End: 2021-05-03

## 2021-05-11 ENCOUNTER — APPOINTMENT (OUTPATIENT)
Dept: BARIATRICS/WEIGHT MGMT | Facility: CLINIC | Age: 46
End: 2021-05-11
Payer: COMMERCIAL

## 2021-05-11 VITALS — BODY MASS INDEX: 55.32 KG/M2 | HEIGHT: 61 IN | WEIGHT: 293 LBS

## 2021-05-11 PROCEDURE — 90832 PSYTX W PT 30 MINUTES: CPT | Mod: 95

## 2021-05-17 ENCOUNTER — APPOINTMENT (OUTPATIENT)
Dept: BARIATRICS/WEIGHT MGMT | Facility: CLINIC | Age: 46
End: 2021-05-17
Payer: COMMERCIAL

## 2021-05-17 VITALS — BODY MASS INDEX: 55.32 KG/M2 | WEIGHT: 293 LBS | HEIGHT: 61 IN

## 2021-05-17 PROCEDURE — 99213 OFFICE O/P EST LOW 20 MIN: CPT | Mod: 95

## 2021-05-17 NOTE — HISTORY OF PRESENT ILLNESS
[Home] : at home, [unfilled] , at the time of the visit. [Medical Office: ([facility])___] : at the medical office located in  [Verbal consent obtained from patient] : the patient, [unfilled] [FreeTextEntry1] : 45F PMH obesity, hypothyroidism, prediabetes, NAFLD, dyslipidemia, venous insufficiency who presents to Obesity Medicine for follow-up.\par \par Patient initially presented on 12/7/20 with goal for pre-surgical weight loss. \par \par Weight today: 360 lbs, BMI 68.02\par Weight at last visit (3/16/21): 361 lbs, BMI 68.21\par Weight at initial visit (12/7/20): 372 lbs (reported), BMI 70.29\par \par Diet: At party last night she exercised restraint with all of the "junk food" and portions, despite the temptations and other people trying to get her to go get second portions.\par Eating 3 times per day, sometimes a meal is a protein shake.\par Sometimes snacks between meals\par Night eat sometimes, a few pieces of chocolate, 1 chocolate chip cookie.\par \par Exercise: Taking nephew on walks in [place] 2-3 times per week for 10 minutes.\par \par Sleep: Picked up kit for sleep study and saw specialist, still has to take it. \par \par Medication: Has been tolerating Phentermine 30 mg, Topiramate 50 mg w/o side effects.

## 2021-05-17 NOTE — PHYSICAL EXAM
[Obese, well nourished, in no acute distress] : obese, well nourished, in no acute distress [de-identified] : TEB appointment

## 2021-05-17 NOTE — ASSESSMENT
[FreeTextEntry1] : 45F PMH obesity, hypothyroidism, prediabetes, NAFLD, dyslipidemia, venous insufficiency who presents to Obesity Medicine for follow-up\par \par # Obesity c/b pre-dm, NAFLD, dyslipidemia, venous insufficiency, hypothyroid: Weight today 360 lbs, BMI 68.02. Demonstrating some improvement in eating habits, some snacking and night eating and uses protein shakes regularly. Overall very sedentary but notes some swelling in the legs when she's walking.\par - Food log\par - C/w dietician and psychology\par - C/w dietary habits, work toward cutting out night eating and snacks, and processed foods\par - C/w regular walks with nephew and stroller\par - Try to get in short walks every day\par - F/u sleep study\par - Increase medication to Phentermine 37.5 mg, Topiramate 100 mg\par \par \par \par  \par \par Bariatric Surgery History: No\par \par Obesity Comorbidities: hypothyroidism, prediabetes, NAFLD, dyslipidemia, venous insufficiency\par \par Obesity Comorbidity resolution or improvement: \par \par Anti-Obesity Medications: Phentermine 30 mg, topiramate 25 mg\par \par Obesity Medication Side Effects: None

## 2021-05-18 ENCOUNTER — RX CHANGE (OUTPATIENT)
Age: 46
End: 2021-05-18

## 2021-05-18 RX ORDER — TOPIRAMATE 25 MG/1
25 TABLET, FILM COATED ORAL
Qty: 120 | Refills: 0 | Status: DISCONTINUED | COMMUNITY
Start: 2021-02-22 | End: 2021-05-18

## 2021-05-21 RX ORDER — METOPROLOL SUCCINATE 25 MG/1
25 TABLET, EXTENDED RELEASE ORAL
Qty: 90 | Refills: 0 | Status: ACTIVE | COMMUNITY
Start: 2021-02-17 | End: 1900-01-01

## 2021-06-01 ENCOUNTER — APPOINTMENT (OUTPATIENT)
Dept: BARIATRICS/WEIGHT MGMT | Facility: CLINIC | Age: 46
End: 2021-06-01
Payer: COMMERCIAL

## 2021-06-01 DIAGNOSIS — E66.01 MORBID (SEVERE) OBESITY DUE TO EXCESS CALORIES: ICD-10-CM

## 2021-06-01 PROCEDURE — 97803 MED NUTRITION INDIV SUBSEQ: CPT | Mod: 95

## 2021-06-02 PROBLEM — E66.01 MORBID OBESITY: Status: ACTIVE | Noted: 2020-01-03

## 2021-06-09 ENCOUNTER — APPOINTMENT (OUTPATIENT)
Dept: PULMONOLOGY | Facility: CLINIC | Age: 46
End: 2021-06-09

## 2021-06-14 ENCOUNTER — APPOINTMENT (OUTPATIENT)
Dept: BARIATRICS/WEIGHT MGMT | Facility: CLINIC | Age: 46
End: 2021-06-14
Payer: COMMERCIAL

## 2021-06-14 VITALS — HEIGHT: 61 IN | WEIGHT: 293 LBS | BODY MASS INDEX: 55.32 KG/M2

## 2021-06-14 PROCEDURE — 90832 PSYTX W PT 30 MINUTES: CPT | Mod: 95

## 2021-06-24 ENCOUNTER — APPOINTMENT (OUTPATIENT)
Dept: SLEEP CENTER | Facility: CLINIC | Age: 46
End: 2021-06-24
Payer: COMMERCIAL

## 2021-06-24 PROCEDURE — ZZZZZ: CPT

## 2021-06-28 ENCOUNTER — LABORATORY RESULT (OUTPATIENT)
Age: 46
End: 2021-06-28

## 2021-06-28 ENCOUNTER — APPOINTMENT (OUTPATIENT)
Dept: CARDIOLOGY | Facility: CLINIC | Age: 46
End: 2021-06-28
Payer: COMMERCIAL

## 2021-06-28 VITALS
BODY MASS INDEX: 55.32 KG/M2 | HEIGHT: 61 IN | SYSTOLIC BLOOD PRESSURE: 112 MMHG | TEMPERATURE: 97.5 F | WEIGHT: 293 LBS | OXYGEN SATURATION: 93 % | RESPIRATION RATE: 18 BRPM | HEART RATE: 89 BPM | DIASTOLIC BLOOD PRESSURE: 70 MMHG

## 2021-06-28 PROCEDURE — 99072 ADDL SUPL MATRL&STAF TM PHE: CPT

## 2021-06-28 PROCEDURE — 99214 OFFICE O/P EST MOD 30 MIN: CPT

## 2021-06-28 RX ORDER — PHENTERMINE HYDROCHLORIDE 15 MG/1
15 CAPSULE ORAL
Qty: 30 | Refills: 0 | Status: DISCONTINUED | COMMUNITY
Start: 2021-01-04

## 2021-06-28 RX ORDER — PHENTERMINE HYDROCHLORIDE 30 MG/1
30 CAPSULE ORAL
Qty: 30 | Refills: 0 | Status: DISCONTINUED | COMMUNITY
Start: 2021-03-16

## 2021-06-28 NOTE — ASSESSMENT
[FreeTextEntry1] : This is a 45-year-old female past medical history significant for pre-diabetes, obesity, sleep apnea, ovarian cyst, status post hernia repair, status post right oophorectomy, status post meniscus repair, hypothyroidism, who comes in for follow up. \par \par She feels well today and does not have chest pains at this time. She states that she is still planning on having bariatric surgery but does not have a date as of now. She also just completed her sleep study evaluation last week for sleep apnea and those results are pending. \par \par She states that her palpitations have resolved and she is feeling generally well today.\par \par BLOOD PRESSURE:\par -BP is well controlled in today's visit and patient is on Metoprolol ER 25mg PO DAILY, furosemide 40mg PO DAILY and on Spironolactone 25mg PO DAILY. \par \par BLOOD WORK:\par -Blood work was done 2/17/2021 which demonstrated LDL of 120. ASCVD Risk of 1.4%.\par -New blood work done today to evaluate lipid profile and electrolytes.\par \par CHOLESTEROL CONTROL:\par -Patient will continue the advised  TLC diet and to continue follow-up for treatment of hyperlipidemia and repeat blood testing with diet and exercise. I have discussed different exercises and the importance of maintenance of optimal body weight. The importance of staying within guidelines and recommendations was stressed to the patient today and they acknowledged that they understand this to me verbally.\par \par  -Ms. GAGNON was educated and advised that failure to follow-up with my medical recommendations to lower cholesterol can result in severe health consequences therefore, they will continuing a low saturated and low fat diet and to avoid excessive carbohydrates to help reduce triglycerides and that lowering LDL levels is associated with a significant decrease in serious cardiac events including but not limited to heart attack stroke and overall death. We will continue lipid lowering agents as advised based on blood test results and the patient understands to call if they develop severe muscle discomfort or if they have a reddish tinted discoloration in their urine.\par \par TESTING/REPORTS:\par -EKG done Apr 26, 2021 which demonstrated regular sinus rhythm with nonspecific ST-T wave changes, BPM of 81 however, there was one point during the EKG that her HR increased to 150's which we were unable to capture. \par \par -The patient had a 24 hour holter monitor placed on 4/26/2021 which demonstrated sinus rhythm average HR 90 BPM with moderate VPC's, couplets, bigeminy and trigeminy. She does not have palpitations at this time and states that the day she was feeling anxious and does not know why she was having palpitations at that time. \par \par -She currently remains on Metoprolol ER 25mg PO DAILY at night.\par \par -The patient had an echocardiogram done on 11/17/2020 demonstrated mild mitral tricuspid and pulmonic regurgitation with normal left ventricular systolic function. EF of 65%.\par  \par -The patient had a normal exercise stress test done on 2/17/2021.\par \par PLAN:\par -She will continue with her usual medications and will contact the office if she is having any complaints between now and their next follow up appointment.\par -She will follow up with her bariatric doctor Renetta Jarquin.\par \par I have discussed the plan of care with Ms. SHWETA GAGNON  and she  will follow up in 3 months. She is compliant with all of her medications.\par \par The patient understands that aerobic exercises must be increased to minutes 4 times/week and a detailed discussion of lifestyle modification was done today. \par The patient has a good understanding of the diagnosis, treatment plan and lifestyle modification. \par She will contact me at the office for any questions with their care or any changes in their health status.\par \par The plan of care was discussed with supervising physician, Dr. Saab while present in the office at the time of the visit. \par \par Aruna BELTRAN

## 2021-06-28 NOTE — PHYSICAL EXAM
[Well Developed] : well developed [Well Nourished] : well nourished [No Acute Distress] : no acute distress [Normal Venous Pressure] : normal venous pressure [No Carotid Bruit] : no carotid bruit [Normal S1, S2] : normal S1, S2 [No Murmur] : no murmur [No Rub] : no rub [No Gallop] : no gallop [Clear Lung Fields] : clear lung fields [Good Air Entry] : good air entry [No Respiratory Distress] : no respiratory distress  [Soft] : abdomen soft [Non Tender] : non-tender [No Masses/organomegaly] : no masses/organomegaly [Normal Bowel Sounds] : normal bowel sounds [Normal Gait] : normal gait [No Edema] : no edema [No Clubbing] : no clubbing [No Cyanosis] : no cyanosis [No Varicosities] : no varicosities [No Rash] : no rash [No Skin Lesions] : no skin lesions [Moves all extremities] : moves all extremities [No Focal Deficits] : no focal deficits [Normal Speech] : normal speech [Alert and Oriented] : alert and oriented [Normal memory] : normal memory [General Appearance - Well Developed] : well developed [Normal Appearance] : normal appearance [Well Groomed] : well groomed [General Appearance - Well Nourished] : well nourished [No Deformities] : no deformities [General Appearance - In No Acute Distress] : no acute distress [Normal Conjunctiva] : the conjunctiva exhibited no abnormalities [Normal Oral Mucosa] : normal oral mucosa [Normal Oropharynx] : normal oropharynx [Normal Jugular Venous A Waves Present] : normal jugular venous A waves present [Normal Jugular Venous V Waves Present] : normal jugular venous V waves present [No Jugular Venous Medina A Waves] : no jugular venous medina A waves [Respiration, Rhythm And Depth] : normal respiratory rhythm and effort [Exaggerated Use Of Accessory Muscles For Inspiration] : no accessory muscle use [Auscultation Breath Sounds / Voice Sounds] : lungs were clear to auscultation bilaterally [Bowel Sounds] : normal bowel sounds [Abdomen Soft] : soft [Abnormal Walk] : normal gait [Gait - Sufficient For Exercise Testing] : the gait was sufficient for exercise testing [Nail Clubbing] : no clubbing of the fingernails [Cyanosis, Localized] : no localized cyanosis [Skin Color & Pigmentation] : normal skin color and pigmentation [Skin Turgor] : normal skin turgor [] : no rash [Oriented To Time, Place, And Person] : oriented to person, place, and time [Affect] : the affect was normal [Mood] : the mood was normal [No Anxiety] : not feeling anxious [5th Left ICS - MCL] : palpated at the 5th LICS in the midclavicular line [Normal] : normal [No Precordial Heave] : no precordial heave was noted [Normal Rate] : normal [Rhythm Regular] : regular [Normal S1] : normal S1 [Normal S2] : normal S2 [II] : a grade 2 [2+] : left 2+ [No Abnormalities] : the abdominal aorta was not enlarged and no bruit was heard [Nonpitting Edema] : nonpitting edema present [S3] : no S3 [S4] : no S4 [Click] : no click [Pericardial Rub] : no pericardial rub [Right Carotid Bruit] : no bruit heard over the right carotid [Left Carotid Bruit] : no bruit heard over the left carotid [Right Femoral Bruit] : no bruit heard over the right femoral artery [Left Femoral Bruit] : no bruit heard over the left femoral artery [Rt] : no varicose veins of the right leg [Lt] : no varicose veins of the left leg

## 2021-06-28 NOTE — REASON FOR VISIT
[Arrhythmia/ECG Abnorrmalities] : arrhythmia/ECG abnormalities [Hypertension] : hypertension [Follow-Up - Clinic] : a clinic follow-up of [Dyspnea] : dyspnea [Hyperlipidemia] : hyperlipidemia [FreeTextEntry1] : murmur

## 2021-06-29 ENCOUNTER — NON-APPOINTMENT (OUTPATIENT)
Age: 46
End: 2021-06-29

## 2021-06-30 ENCOUNTER — APPOINTMENT (OUTPATIENT)
Dept: SLEEP CENTER | Facility: CLINIC | Age: 46
End: 2021-06-30
Payer: COMMERCIAL

## 2021-06-30 PROCEDURE — ZZZZZ: CPT

## 2021-07-07 ENCOUNTER — APPOINTMENT (OUTPATIENT)
Dept: SLEEP CENTER | Facility: CLINIC | Age: 46
End: 2021-07-07
Payer: COMMERCIAL

## 2021-07-07 ENCOUNTER — OUTPATIENT (OUTPATIENT)
Dept: OUTPATIENT SERVICES | Facility: HOSPITAL | Age: 46
LOS: 1 days | End: 2021-07-07
Payer: COMMERCIAL

## 2021-07-07 ENCOUNTER — APPOINTMENT (OUTPATIENT)
Dept: CARDIOLOGY | Facility: CLINIC | Age: 46
End: 2021-07-07

## 2021-07-07 DIAGNOSIS — Z90.721 ACQUIRED ABSENCE OF OVARIES, UNILATERAL: Chronic | ICD-10-CM

## 2021-07-07 DIAGNOSIS — Z98.890 OTHER SPECIFIED POSTPROCEDURAL STATES: Chronic | ICD-10-CM

## 2021-07-07 PROCEDURE — 95806 SLEEP STUDY UNATT&RESP EFFT: CPT

## 2021-07-07 PROCEDURE — 95806 SLEEP STUDY UNATT&RESP EFFT: CPT | Mod: 26

## 2021-07-14 ENCOUNTER — APPOINTMENT (OUTPATIENT)
Dept: PULMONOLOGY | Facility: CLINIC | Age: 46
End: 2021-07-14
Payer: COMMERCIAL

## 2021-07-14 VITALS
OXYGEN SATURATION: 98 % | TEMPERATURE: 97.5 F | WEIGHT: 293 LBS | RESPIRATION RATE: 18 BRPM | HEART RATE: 88 BPM | SYSTOLIC BLOOD PRESSURE: 138 MMHG | BODY MASS INDEX: 55.32 KG/M2 | DIASTOLIC BLOOD PRESSURE: 80 MMHG | HEIGHT: 61 IN

## 2021-07-14 DIAGNOSIS — Z01.811 ENCOUNTER FOR PREPROCEDURAL RESPIRATORY EXAMINATION: ICD-10-CM

## 2021-07-14 DIAGNOSIS — G47.19 OTHER HYPERSOMNIA: ICD-10-CM

## 2021-07-14 DIAGNOSIS — R53.83 OTHER FATIGUE: ICD-10-CM

## 2021-07-14 PROCEDURE — 99072 ADDL SUPL MATRL&STAF TM PHE: CPT

## 2021-07-14 PROCEDURE — 99213 OFFICE O/P EST LOW 20 MIN: CPT

## 2021-07-14 NOTE — REVIEW OF SYSTEMS
[Fatigue] : fatigue [Recent Wt Gain (___ Lbs)] : ~T recent [unfilled] lb weight gain [EDS] : EDS [Obesity] : obesity [Negative] : Psychiatric

## 2021-07-14 NOTE — REASON FOR VISIT
[Follow-Up] : a follow-up visit [Sleep Evaluation] : sleep evaluation [Sleep Apnea] : sleep apnea [Pre-op Risk Stratification] : pre-op risk stratification

## 2021-07-16 DIAGNOSIS — G47.33 OBSTRUCTIVE SLEEP APNEA (ADULT) (PEDIATRIC): ICD-10-CM

## 2021-07-16 PROBLEM — R53.83 OTHER FATIGUE: Status: ACTIVE | Noted: 2021-07-16

## 2021-07-16 PROBLEM — G47.19 EXCESSIVE DAYTIME SLEEPINESS: Status: ACTIVE | Noted: 2021-07-16

## 2021-07-16 PROBLEM — Z01.811 ENCOUNTER FOR PRE-OPERATIVE RESPIRATORY CLEARANCE: Status: ACTIVE | Noted: 2020-09-18

## 2021-07-16 NOTE — ASSESSMENT
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 \par She has now failed HST x 2. The plan for the patient is as follows:\par \par #1.VAHE hx\par -has failed sleep study twice at this point, next plan is to get her in for a Split night study next. \par -discussed with her that if there is not enough data, the study stays a PSG only\par -recommend that she get prior records for office so we can review her prior sleep results.\par -she may have had such a negative experience with CPAP therapy if she truly needs bipap therapy instead.\par -Consequences of untreated sleep apnea were discussed with the patient including heart conditions, hypertension, diabetes, chronic inflammation, memory issues, stroke, obesity, decreased libido, sleep related accidents, as well as anxiety and depression.\par -We went through different treatment options for him:\par 1.Continuous positive airway pressure therapy (CPAP) uses a machine to help a person who has obstructive sleep apnea (VAHE) breathe more easily during sleep. A CPAP machine increases air pressure in your throat so that your airway doesn't collapse when you breathe in. Some patients will need Bipap.\par 2.Oral appliances are safe, noninvasive and highly effective for treating snoring and varying severities of obstructive sleep apnea. The oral devices are designed to position the lower jaw slightly forward and down. This opens the airway. This option is not as suitable with those that have moderate to severe sleep apnea as well as central or complex sleep apnea. We can consider this if he is unable to tolerate PAP therapy, but this would not be first line treatment. \par 3.Weight loss: treating sleep apnea, like treating many diseases, starts with lifestyle and behavioral modifications. For most VAHE sufferers, this includes working toward a healthy body weight. Weight loss reduces fatty deposits in the neck and tongue which can contribute to restricted airflow. This also reduces abdominal fat, which in turn increases lung volume and improves airway traction, making the airway less likely to collapse during sleep.  Weight loss of just 10-15% can reduce the severity of VAHE by 50% in moderately obese patients. Unfortunately, while weight loss can provide meaningful improvements in VAHE, it usually does not lead to a complete cure, and many sleep apnea patients need additional therapies.\par \par #2.EDS, fatigue\par -consider Nuvigil\par -if phentermine is not effective for her- consider dc'ing in the event we decide to try Nuvigil.\par -She does not have reported sleepy driving and understands the risks.\par \par Pt to keep me posted on sleep study date.\par Advised to call the office with any concerns.

## 2021-07-16 NOTE — HISTORY OF PRESENT ILLNESS
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 for pre surgical optimization prior to planned gastric sleeve surgery. \par \par She has completed 2 HST and both have come back as poor quality studies due to pulse ox and rip flow quality.\par She is unsure what she is doing wrong. She is following instructions.\par Main sleep symptoms include snoring, apneas, EDS, unintentional dozing, poor sleep quality, DMS, DIS, bedtime is approx 1-2 am and she tries to be up by 730 or 8 am. Rediscussed questions on ESS- ESS # 9.\par \par She has a hx of sleep apnea but the patient reports that CPAP is intolerable. She feels like she is suffocating with use. It has never been comfortable for her and feels like it worsens her sleep issues. Is not able to tolerate it for longer than 1 hour. \par \par She is currently on diet meds: phentermine and topiramate. She is unsure how much they are helping- has been on them x 6 months. \par \par Pt reports that she is dealing with issues with thyroid levels- she has a planned visit with endo coming up. \par No new pulmonary complaints. Wants to move pulm clearance along for her surgery. \par

## 2021-07-23 ENCOUNTER — APPOINTMENT (OUTPATIENT)
Dept: ENDOCRINOLOGY | Facility: CLINIC | Age: 46
End: 2021-07-23

## 2021-07-29 LAB
T4 FREE SERPL-MCNC: 1.5 NG/DL
TSH SERPL-ACNC: 2.87 UIU/ML

## 2021-08-16 ENCOUNTER — RX RENEWAL (OUTPATIENT)
Age: 46
End: 2021-08-16

## 2021-09-20 ENCOUNTER — APPOINTMENT (OUTPATIENT)
Dept: BARIATRICS/WEIGHT MGMT | Facility: CLINIC | Age: 46
End: 2021-09-20
Payer: COMMERCIAL

## 2021-09-20 PROCEDURE — 90832 PSYTX W PT 30 MINUTES: CPT | Mod: 95

## 2021-09-29 ENCOUNTER — APPOINTMENT (OUTPATIENT)
Dept: BARIATRICS/WEIGHT MGMT | Facility: CLINIC | Age: 46
End: 2021-09-29

## 2021-10-13 ENCOUNTER — APPOINTMENT (OUTPATIENT)
Dept: BARIATRICS/WEIGHT MGMT | Facility: CLINIC | Age: 46
End: 2021-10-13
Payer: COMMERCIAL

## 2021-10-13 VITALS — WEIGHT: 293 LBS | HEIGHT: 61 IN | BODY MASS INDEX: 55.32 KG/M2

## 2021-10-13 PROCEDURE — 90832 PSYTX W PT 30 MINUTES: CPT | Mod: 95

## 2021-10-25 ENCOUNTER — APPOINTMENT (OUTPATIENT)
Dept: BARIATRICS/WEIGHT MGMT | Facility: CLINIC | Age: 46
End: 2021-10-25
Payer: COMMERCIAL

## 2021-10-25 DIAGNOSIS — K76.0 FATTY (CHANGE OF) LIVER, NOT ELSEWHERE CLASSIFIED: ICD-10-CM

## 2021-10-25 PROCEDURE — 99213 OFFICE O/P EST LOW 20 MIN: CPT | Mod: 95

## 2021-10-25 RX ORDER — PHENTERMINE HYDROCHLORIDE 37.5 MG/1
37.5 CAPSULE ORAL
Qty: 30 | Refills: 0 | Status: DISCONTINUED | COMMUNITY
Start: 2020-12-07 | End: 2021-10-25

## 2021-10-25 NOTE — PHYSICAL EXAM
[Obese, well nourished, in no acute distress] : obese, well nourished, in no acute distress [de-identified] : TEB appointment

## 2021-10-25 NOTE — ASSESSMENT
[FreeTextEntry1] : 45F PMH obesity, hypothyroidism, prediabetes, NAFLD, dyslipidemia, venous insufficiency who presents to Obesity Medicine for follow-up\par \par # Obesity c/b pre-dm, NAFLD, dyslipidemia, venous insufficiency, hypothyroid: Weight today 372 lbs, BMI 70.29 Has weight fluctuations and has regained to starting weight. On Topiramate intermittently (forgets) and hasn't been prescribed Phentermine in a few months). Binging behavior noted, weight fluctuations. Three primary goals for next visit:\par - 1. Set an alarm on her phone each evening (ie 9 pm) as a reminder to take her Topiramate\par - 2. Set a bedtime - (ie ~11 pm) where she will shut down screens and dim lights with goal of earlier bedtime and more sleep. She is used to sleeping with TV on since childhood, discussed trying a night light first.\par - 3. Start eating a small breakfast (with goal to eat less in the evening)\par - Food log\par - C/w dietician \par - C/w psychology\par - C/w Topiramate, have discontinued Phentermine due to tachycardia and NSVT (and is also already on metoprolol which may be blocking some of action)\par - Patient intends to throw away junk food in the house. \par - Aim for short walks\par - F/u in 2-3 weeks\par - May consider adding Orilstat, Metformin, or GLP if covered at next visit.\par \par \par  \par \par Bariatric Surgery History: No\par \par Obesity Comorbidities: hypothyroidism, prediabetes, NAFLD, dyslipidemia, venous insufficiency\par \par Obesity Comorbidity resolution or improvement: \par \par Anti-Obesity Medications: Topiramate 25 mg\par \par Obesity Medication Side Effects: None. Discontinued Phentermine as she has PVCs, NSVT 5 beats on holter monitor and some tachycardia.

## 2021-11-01 ENCOUNTER — NON-APPOINTMENT (OUTPATIENT)
Age: 46
End: 2021-11-01

## 2021-11-01 ENCOUNTER — APPOINTMENT (OUTPATIENT)
Dept: CARDIOLOGY | Facility: CLINIC | Age: 46
End: 2021-11-01
Payer: COMMERCIAL

## 2021-11-01 VITALS
WEIGHT: 293 LBS | HEART RATE: 75 BPM | DIASTOLIC BLOOD PRESSURE: 78 MMHG | OXYGEN SATURATION: 100 % | BODY MASS INDEX: 55.32 KG/M2 | SYSTOLIC BLOOD PRESSURE: 124 MMHG | RESPIRATION RATE: 16 BRPM | TEMPERATURE: 98.2 F | HEIGHT: 61 IN

## 2021-11-01 DIAGNOSIS — R60.0 LOCALIZED EDEMA: ICD-10-CM

## 2021-11-01 DIAGNOSIS — G47.33 OBSTRUCTIVE SLEEP APNEA (ADULT) (PEDIATRIC): ICD-10-CM

## 2021-11-01 DIAGNOSIS — E78.00 PURE HYPERCHOLESTEROLEMIA, UNSPECIFIED: ICD-10-CM

## 2021-11-01 DIAGNOSIS — E66.01 MORBID (SEVERE) OBESITY DUE TO EXCESS CALORIES: ICD-10-CM

## 2021-11-01 DIAGNOSIS — R73.03 PREDIABETES.: ICD-10-CM

## 2021-11-01 DIAGNOSIS — R01.1 CARDIAC MURMUR, UNSPECIFIED: ICD-10-CM

## 2021-11-01 DIAGNOSIS — R00.2 PALPITATIONS: ICD-10-CM

## 2021-11-01 PROCEDURE — 93000 ELECTROCARDIOGRAM COMPLETE: CPT

## 2021-11-01 PROCEDURE — 99214 OFFICE O/P EST MOD 30 MIN: CPT

## 2021-11-01 NOTE — REASON FOR VISIT
[Follow-Up - Clinic] : a clinic follow-up of [Dyspnea] : dyspnea [CV Risk Factors and Non-Cardiac Disease] : CV risk factors and non-cardiac disease [Hypertension] : hypertension [Other: ____] : [unfilled] [FreeTextEntry1] : murmur [FreeTextEntry2] : Preop clearance

## 2021-11-01 NOTE — PHYSICAL EXAM
[General Appearance - Well Developed] : well developed [Normal Appearance] : normal appearance [Well Groomed] : well groomed [General Appearance - Well Nourished] : well nourished [No Deformities] : no deformities [General Appearance - In No Acute Distress] : no acute distress [Normal Oral Mucosa] : normal oral mucosa [Normal Oropharynx] : normal oropharynx [Normal Jugular Venous A Waves Present] : normal jugular venous A waves present [Normal Jugular Venous V Waves Present] : normal jugular venous V waves present [No Jugular Venous Medina A Waves] : no jugular venous medina A waves [Respiration, Rhythm And Depth] : normal respiratory rhythm and effort [Exaggerated Use Of Accessory Muscles For Inspiration] : no accessory muscle use [Auscultation Breath Sounds / Voice Sounds] : lungs were clear to auscultation bilaterally [Bowel Sounds] : normal bowel sounds [Abdomen Soft] : soft [Abnormal Walk] : normal gait [Gait - Sufficient For Exercise Testing] : the gait was sufficient for exercise testing [Nail Clubbing] : no clubbing of the fingernails [Cyanosis, Localized] : no localized cyanosis [Skin Color & Pigmentation] : normal skin color and pigmentation [Skin Turgor] : normal skin turgor [] : no rash [Oriented To Time, Place, And Person] : oriented to person, place, and time [Affect] : the affect was normal [Mood] : the mood was normal [No Anxiety] : not feeling anxious [No Precordial Heave] : no precordial heave was noted [II] : a grade 2 [No Abnormalities] : the abdominal aorta was not enlarged and no bruit was heard [Nonpitting Edema] : nonpitting edema present [Well Developed] : well developed [Well Nourished] : well nourished [No Acute Distress] : no acute distress [Normal Conjunctiva] : normal conjunctiva [Normal Venous Pressure] : normal venous pressure [No Carotid Bruit] : no carotid bruit [Normal S1, S2] : normal S1, S2 [No Rub] : no rub [No Gallop] : no gallop [5th Left ICS - MCL] : palpated at the 5th LICS in the midclavicular line [Normal] : normal [Normal Rate] : normal [Rhythm Regular] : regular [Normal S1] : normal S1 [Normal S2] : normal S2 [I] : a grade 1 [2+] : left 2+ [Clear Lung Fields] : clear lung fields [Good Air Entry] : good air entry [No Respiratory Distress] : no respiratory distress  [Soft] : abdomen soft [Non Tender] : non-tender [No Masses/organomegaly] : no masses/organomegaly [Normal Bowel Sounds] : normal bowel sounds [Normal Gait] : normal gait [No Edema] : no edema [No Cyanosis] : no cyanosis [No Clubbing] : no clubbing [No Varicosities] : no varicosities [No Rash] : no rash [No Skin Lesions] : no skin lesions [Moves all extremities] : moves all extremities [No Focal Deficits] : no focal deficits [Normal Speech] : normal speech [Alert and Oriented] : alert and oriented [Normal memory] : normal memory [S3] : no S3 [S4] : no S4 [Click] : no click [Pericardial Rub] : no pericardial rub [Right Carotid Bruit] : no bruit heard over the right carotid [Left Carotid Bruit] : no bruit heard over the left carotid [Right Femoral Bruit] : no bruit heard over the right femoral artery [Left Femoral Bruit] : no bruit heard over the left femoral artery [Rt] : no varicose veins of the right leg [Lt] : no varicose veins of the left leg

## 2021-11-01 NOTE — DISCUSSION/SUMMARY
[FreeTextEntry1] : This is a 45-year-old female past medical history significant for hyperglycemia, obesity, sleep apnea, ovarian cyst, status post hernia repair, status post right oophorectomy, status post meniscus repair, hypothyroidism, who comes in for cardiac preoperative follow-up evaluation.  \par She denies chest pain, shortness of breath, dizziness or syncope.\par Electrocardiogram done November 1, 2021 demonstrate normal sinus rhythm rate of 84 bpm is otherwise remarkable for poor R wave progression.\par Blood work done June 28, 2021 demonstrated potassium of 4.3, direct LDL of 113, cholesterol 168, HDL of 45, triglycerides of 80, non-HDL cholesterol 123 mg/dL.\par The patient is concerned about inability to lose weight, and overall cardiovascular risk.\par The patient is a candidate for bariatric surgery and I have recommended she seek a surgical consultation if she is not done so already.\par She also expressed interest in the semaglutide injectables for weight loss.  I have asked her to follow-up with her obesity medicine team and endocrinologist regarding this particular issue.\par \par She will continue to try to elevate her legs and remain on diuretic therapy.  She will get me a copy of most recent blood work done by her primary care physician for electrolytes.\par \par She understands he must limit her salt intake.  She is unable to wear compression stockings.\par The patient had a normal modified Julio Cesar protocol exercise stress test February 17, 2021.\par

## 2021-11-05 ENCOUNTER — RX RENEWAL (OUTPATIENT)
Age: 46
End: 2021-11-05

## 2021-11-05 RX ORDER — TOPIRAMATE 50 MG/1
50 TABLET, FILM COATED ORAL
Qty: 60 | Refills: 1 | Status: ACTIVE | COMMUNITY
Start: 2021-05-18 | End: 1900-01-01

## 2021-11-08 ENCOUNTER — APPOINTMENT (OUTPATIENT)
Dept: BARIATRICS/WEIGHT MGMT | Facility: CLINIC | Age: 46
End: 2021-11-08
Payer: COMMERCIAL

## 2021-11-08 PROCEDURE — 90832 PSYTX W PT 30 MINUTES: CPT | Mod: 95

## 2021-11-22 ENCOUNTER — APPOINTMENT (OUTPATIENT)
Dept: BARIATRICS/WEIGHT MGMT | Facility: CLINIC | Age: 46
End: 2021-11-22
Payer: COMMERCIAL

## 2021-11-22 PROCEDURE — 90832 PSYTX W PT 30 MINUTES: CPT | Mod: 95

## 2021-12-15 ENCOUNTER — APPOINTMENT (OUTPATIENT)
Dept: BARIATRICS/WEIGHT MGMT | Facility: CLINIC | Age: 46
End: 2021-12-15
Payer: COMMERCIAL

## 2021-12-15 PROCEDURE — 90832 PSYTX W PT 30 MINUTES: CPT | Mod: 95

## 2022-01-18 ENCOUNTER — NON-APPOINTMENT (OUTPATIENT)
Age: 47
End: 2022-01-18

## 2022-01-19 ENCOUNTER — APPOINTMENT (OUTPATIENT)
Dept: BARIATRICS/WEIGHT MGMT | Facility: CLINIC | Age: 47
End: 2022-01-19

## 2022-01-24 ENCOUNTER — APPOINTMENT (OUTPATIENT)
Dept: CARDIOLOGY | Facility: CLINIC | Age: 47
End: 2022-01-24

## 2022-02-28 ENCOUNTER — APPOINTMENT (OUTPATIENT)
Dept: ENDOCRINOLOGY | Facility: CLINIC | Age: 47
End: 2022-02-28